# Patient Record
Sex: MALE | Race: WHITE | Employment: OTHER | RURAL
[De-identification: names, ages, dates, MRNs, and addresses within clinical notes are randomized per-mention and may not be internally consistent; named-entity substitution may affect disease eponyms.]

---

## 2021-03-26 ENCOUNTER — HOSPITAL ENCOUNTER (INPATIENT)
Age: 51
LOS: 10 days | Discharge: HOME | DRG: 885 | End: 2021-04-05
Attending: PSYCHIATRY & NEUROLOGY | Admitting: PSYCHIATRY & NEUROLOGY
Payer: COMMERCIAL

## 2021-03-26 PROBLEM — F31.9 BIPOLAR 1 DISORDER (HCC): Status: ACTIVE | Noted: 2021-03-26

## 2021-03-26 PROCEDURE — 74011250637 HC RX REV CODE- 250/637: Performed by: PSYCHIATRY & NEUROLOGY

## 2021-03-26 PROCEDURE — 65220000003 HC RM SEMIPRIVATE PSYCH

## 2021-03-26 PROCEDURE — 74011250636 HC RX REV CODE- 250/636: Performed by: PSYCHIATRY & NEUROLOGY

## 2021-03-26 RX ORDER — MAG HYDROX/ALUMINUM HYD/SIMETH 200-200-20
30 SUSPENSION, ORAL (FINAL DOSE FORM) ORAL
Status: DISCONTINUED | OUTPATIENT
Start: 2021-03-26 | End: 2021-04-05 | Stop reason: HOSPADM

## 2021-03-26 RX ORDER — ACETAMINOPHEN 325 MG/1
650 TABLET ORAL
Status: DISCONTINUED | OUTPATIENT
Start: 2021-03-26 | End: 2021-04-05 | Stop reason: HOSPADM

## 2021-03-26 RX ORDER — HALOPERIDOL 5 MG/ML
5 INJECTION INTRAMUSCULAR
Status: DISCONTINUED | OUTPATIENT
Start: 2021-03-26 | End: 2021-04-05 | Stop reason: HOSPADM

## 2021-03-26 RX ORDER — TRAZODONE HYDROCHLORIDE 50 MG/1
50 TABLET ORAL
Status: DISCONTINUED | OUTPATIENT
Start: 2021-03-26 | End: 2021-04-05 | Stop reason: HOSPADM

## 2021-03-26 RX ORDER — DIVALPROEX SODIUM 500 MG/1
1500 TABLET, EXTENDED RELEASE ORAL
Status: DISCONTINUED | OUTPATIENT
Start: 2021-03-26 | End: 2021-04-05 | Stop reason: HOSPADM

## 2021-03-26 RX ORDER — DIVALPROEX SODIUM 500 MG/1
1500 TABLET, EXTENDED RELEASE ORAL
COMMUNITY
End: 2021-04-05

## 2021-03-26 RX ORDER — IBUPROFEN 200 MG
1 TABLET ORAL
Status: DISCONTINUED | OUTPATIENT
Start: 2021-03-26 | End: 2021-04-05 | Stop reason: HOSPADM

## 2021-03-26 RX ORDER — IBUPROFEN 400 MG/1
400 TABLET ORAL
Status: DISCONTINUED | OUTPATIENT
Start: 2021-03-26 | End: 2021-04-05 | Stop reason: HOSPADM

## 2021-03-26 RX ORDER — QUETIAPINE FUMARATE 300 MG/1
300 TABLET, FILM COATED ORAL
COMMUNITY
End: 2021-04-05

## 2021-03-26 RX ORDER — ADHESIVE BANDAGE
30 BANDAGE TOPICAL DAILY PRN
Status: DISCONTINUED | OUTPATIENT
Start: 2021-03-26 | End: 2021-04-05 | Stop reason: HOSPADM

## 2021-03-26 RX ORDER — HYDROXYZINE PAMOATE 50 MG/1
50 CAPSULE ORAL
Status: DISCONTINUED | OUTPATIENT
Start: 2021-03-26 | End: 2021-04-05 | Stop reason: HOSPADM

## 2021-03-26 RX ADMIN — QUETIAPINE FUMARATE 300 MG: 200 TABLET ORAL at 21:17

## 2021-03-26 RX ADMIN — DIVALPROEX SODIUM 1500 MG: 500 TABLET, EXTENDED RELEASE ORAL at 21:17

## 2021-03-26 RX ADMIN — HALOPERIDOL LACTATE 5 MG: 5 INJECTION, SOLUTION INTRAMUSCULAR at 17:31

## 2021-03-26 NOTE — BH NOTES
Report received from Russ Hayes, RN at 1715. Admitted via  [facility] to unit [facility] pt, accompanied by security and  [place] police x 2..  Alert and oriented to person only. Affect bizarre; Mood elevated with delusions of grandeur and religiosity. \"Yelling \"I am God and I have been washed in the blood of Zeus. \" Tearful about \"sick mother in [place]. \" Pulling gown off. Had blood on hand from \"pulling hemorrhoid out as God wished. \"  Scant bright red blood noted in brief. Smoking and alcohol  danger situations, triggers, coping mechanisms, resources discussed and Nicotine patch offered. Refused. \"I am a  and I like all organic things. I own 3 houses. \"  Rambling. Does not hold a professional license in  .  Dr. Emily Miguel notified of arrival, along with RNS. Treatment will focus on jasvir for safety, medication and group activities to better manage anxiety/depression/mood lability/anger management. Placed on q 15 min safety checks. Ate all of meal while rambling nonsensical words. Showered after injection took effect and was calmer.     PRN Medication Documentation    Specific patient behavior that led to need for PRN medication:severe agitation, yelling, delusional, pulling clothes off  Staff interventions attempted prior to PRN being given:talk with pt, assess situation  PRN medication given: Haldol 5mg IM given at 94 [phone]  Patient response/effectiveness of PRN medication: calmer but still delusional by [id number]

## 2021-03-27 LAB
CHOLEST SERPL-MCNC: 112 MG/DL
EST. AVERAGE GLUCOSE BLD GHB EST-MCNC: 105 MG/DL
GLUCOSE P FAST SERPL-MCNC: 106 MG/DL (ref 65–100)
HBA1C MFR BLD: 5.3 % (ref 4–5.6)
HDLC SERPL-MCNC: 42 MG/DL
HDLC SERPL: 2.7 {RATIO} (ref 0–5)
LDLC SERPL CALC-MCNC: 55.8 MG/DL (ref 0–100)
LIPID PROFILE,FLP: NORMAL
TRIGL SERPL-MCNC: 71 MG/DL (ref ?–150)
TSH SERPL DL<=0.05 MIU/L-ACNC: 1.05 UIU/ML (ref 0.36–3.74)
VLDLC SERPL CALC-MCNC: 14.2 MG/DL

## 2021-03-27 PROCEDURE — 80061 LIPID PANEL: CPT

## 2021-03-27 PROCEDURE — 65220000003 HC RM SEMIPRIVATE PSYCH

## 2021-03-27 PROCEDURE — 84443 ASSAY THYROID STIM HORMONE: CPT

## 2021-03-27 PROCEDURE — 74011250637 HC RX REV CODE- 250/637: Performed by: PSYCHIATRY & NEUROLOGY

## 2021-03-27 PROCEDURE — 36415 COLL VENOUS BLD VENIPUNCTURE: CPT

## 2021-03-27 PROCEDURE — 83036 HEMOGLOBIN GLYCOSYLATED A1C: CPT

## 2021-03-27 PROCEDURE — 93005 ELECTROCARDIOGRAM TRACING: CPT

## 2021-03-27 PROCEDURE — 82947 ASSAY GLUCOSE BLOOD QUANT: CPT

## 2021-03-27 RX ORDER — HALOPERIDOL 5 MG/1
5 TABLET ORAL 2 TIMES DAILY
Status: DISCONTINUED | OUTPATIENT
Start: 2021-03-27 | End: 2021-03-28

## 2021-03-27 RX ORDER — HYDROCORTISONE 25 MG/G
CREAM TOPICAL
Status: DISCONTINUED | OUTPATIENT
Start: 2021-03-27 | End: 2021-04-05 | Stop reason: HOSPADM

## 2021-03-27 RX ADMIN — QUETIAPINE FUMARATE 300 MG: 200 TABLET ORAL at 21:21

## 2021-03-27 RX ADMIN — DIVALPROEX SODIUM 1500 MG: 500 TABLET, EXTENDED RELEASE ORAL at 21:21

## 2021-03-27 RX ADMIN — HYDROCORTISONE: 25 CREAM TOPICAL at 15:50

## 2021-03-27 RX ADMIN — HALOPERIDOL 5 MG: 5 TABLET ORAL at 09:39

## 2021-03-27 RX ADMIN — ACETAMINOPHEN 650 MG: 325 TABLET ORAL at 17:49

## 2021-03-27 NOTE — BH NOTES
Affect and mood labile. Attended and participated in group. Ate snacks. No interaction with peers. Intrusive with staff. + paranoid delusions. Delusions of grandeur. \" I am the Son of Jadiel 7. \" Believes that the staff is practicing [employer] and he stated, \"I don't feel safe here. \" Tearful. Denied SI/HI/AH. Endorsing VH. Seeing \"colors. \" Medication compliant. Co-operative. Pt rested quietly in bed with eyes closed for 8 hours. Respirations even and unlabored. Pt in no apparent distress.

## 2021-03-27 NOTE — H&P
History & Physical    Primary Care Provider: UNKNOWN  Source of Information: Patient/family     History of Presenting Illness:   Do Brown is a 48 y.o. male admitted to the behavioral health unit yesterday with acute psychosis. He had a very bizarre affect with delusions of grandeur and religiosity. On admission he had blood on his hand from \"pulling out a hemorrhoid as God wished. \"  He was noted to have scant blood on his underwear. Review of Systems:  A comprehensive review of systems was negative except for that written in the History of Present Illness. History reviewed. No pertinent past medical history. Past Surgical History:   Procedure Laterality Date    IR GASTRIC BAND ADJ W/ FLUORO         Prior to Admission medications    Medication Sig Start Date End Date Taking? Authorizing Provider   divalproex ER (Depakote ER) 500 mg ER tablet Take 1,500 mg by mouth nightly. Yes Provider, Historical   QUEtiapine (SEROqueL) 300 mg tablet Take 300 mg by mouth nightly. Yes Provider, Historical       No Known Allergies     Family History   Family history unknown: Yes        Social History     Socioeconomic History    Marital status: UNKNOWN     Spouse name: Not on file    Number of children: Not on file    Years of education: Not on file    Highest education level: Not on file   Tobacco Use    Smoking status: Former Smoker   Substance and Sexual Activity    Alcohol use: Not Currently   Other Topics Concern            CODE STATUS:  DNR    Full    Other      Objective:     Physical Exam:     Visit Vitals  /72 (BP 1 Location: Left upper arm, BP Patient Position: Sitting)   Pulse 97   Temp (!) 96.2 °F (35.7 °C)   Resp 19   Ht 5' 11\" (1.803 m)   Wt 80.7 kg (178 lb)   SpO2 98%   BMI 24.83 kg/m²           General:  Alert, cooperative, no distress, appears stated age. Head:  Normocephalic, without obvious abnormality, atraumatic. Eyes:  Conjunctivae/corneas clear. PERRL, EOMs intact. Nose: Nares normal. Septum midline. Mucosa normal. No drainage or sinus tenderness. Throat: Lips, mucosa, and tongue normal. Teeth and gums normal.   Neck: Supple, symmetrical, trachea midline, no adenopathy, thyroid: no enlargement/tenderness/nodules, no carotid bruit and no JVD. Back:   Symmetric, no curvature. ROM normal. No CVA tenderness. Lungs:   Clear to auscultation bilaterally. Chest wall:  No tenderness or deformity. Heart:  Regular rate and rhythm, S1, S2 normal, no murmur, click, rub or gallop. Abdomen:   Soft, non-tender. Bowel sounds normal. No masses,  No organomegaly. Extremities: Extremities normal, atraumatic, no cyanosis or edema. Pulses: 2+ and symmetric all extremities. Skin: Skin color, texture, turgor normal. No rashes or lesions   Neurologic: CNII-XII intact. No motor or sensory deficits.         24 Hour Results:    Recent Results (from the past 24 hour(s))   GLUCOSE, FASTING    Collection Time: 03/27/21  7:42 AM   Result Value Ref Range    Glucose 106 (H) 65 - 100 MG/DL   LIPID PANEL    Collection Time: 03/27/21  7:42 AM   Result Value Ref Range    LIPID PROFILE          Cholesterol, total 112 <200 MG/DL    Triglyceride 71 <150 MG/DL    HDL Cholesterol 42 MG/DL    LDL, calculated 55.8 0 - 100 MG/DL    VLDL, calculated 14.2 MG/DL    CHOL/HDL Ratio 2.7 0.0 - 5.0     TSH 3RD GENERATION    Collection Time: 03/27/21  7:42 AM   Result Value Ref Range    TSH 1.05 0.36 - 3.74 uIU/mL   HEMOGLOBIN A1C WITH EAG    Collection Time: 03/27/21  7:47 AM   Result Value Ref Range    Hemoglobin A1c 5.3 4.0 - 5.6 %    Est. average glucose 105 mg/dL         Imaging:   No orders to display           Assessment:   Acute psychosis    Bipolar disorder with acute luz    No active medical problems requiring intervention      Plan:   Patient medically stable      Home medications were reviewed    Signed By: Paola Mane MD     March 27, 2021

## 2021-03-27 NOTE — ROUTINE PROCESS
Shift change report given to DUSTY Edouard RN, re: SBAR, medications, and behavior. Susana Fall Risk Score - 2.

## 2021-03-27 NOTE — GROUP NOTE
[facility]  GROUP DOCUMENTATION INDIVIDUAL Group Therapy Note Date: 3/26/2021 Group Start Time: 2000 Group End Time: 2030 Group Topic: [email or web address] Liz Sierra [facility]  GROUP DOCUMENTATION GROUP Group Therapy Note Attendees: 3 Attendance: Attended Patient's Goal:  No goal set . Interventions/techniques: Follows Directions: Unable to follow directions Interactions: Unable to interact Mental Status: Preoccupied, Suspicious and Worried Behavior/appearance: Agitated Goals Achieved: Identified distorted thoughts/beliefs Additional Notes:  When patient was asked questions his answer would have nothing to do with the question . [facility]

## 2021-03-27 NOTE — BH NOTES
Alert and oriented x 2. Having delusions of grandeur - \"I am God\",  paranoia \"the water is not clean\" and \"I don't trust many meds\". New meds taken but reluctantly \"Would you give this to Jadiel 7? \" was asked when I was giving the med to him. Refused pm dose of Haldol stating \"I don't like the way it made me feel. \"  Admitted to stopping his meds a few weeks ago at home. Very restless. Has taken 3 showers this am and has changed clothes twice. Denies SI/HI/AVH/pain. Walks quickly. Drinking a lot of water. Cooperative with staff. Labile. Started crying loudly when his mother mentioned. Adhering to mask protocol when reminded. Did not want to talk about his incident of [place] prior to arriving in ER. EKG done.     PRN Medication Documentation    Specific patient behavior that led to need for PRN medication: hemorrhoid irritation  Staff interventions attempted prior to PRN being given: assess need  PRN medication given: Anusol HC cream pr at 1550  Patient response/effectiveness of PRN medication:\"it helped\"    PRN Medication Documentation    Specific patient behavior that led to need for PRN medication: eye strain due to wearing glasses  Staff interventions attempted prior to PRN being given:assess need  PRN medication given: Tylenol 650mg po given at [phone], rest eyes, dim lights  Patient response/effectiveness of PRN medication: 1/10 pain at 1830

## 2021-03-27 NOTE — GROUP NOTE
[facility] GROUP DOCUMENTATION INDIVIDUAL Group Therapy Note Date: 3/27/2021 Group Start Time: 1000 Group End Time: 1100 Group Topic: [employer] Mechelle Magallon 
 
[facility] GROUP DOCUMENTATION GROUP Group Therapy Note Attendees: 4 Attendance: Attended Patient's Goal:  Get off grid Interventions/techniques: Supported Follows Directions: Followed directions Interactions: Interacted appropriately Mental Status: Calm, Delusions, Happy and Suspicious Behavior/appearance: Cooperative Goals Achieved: Able to engage in interactions, Able to listen to others and Able to give feedback to another Additional Notes:  Home Reilly was out for the Group meeting today. He stated all is good (eats,sleeps). He says he is having no anxiety nor depression (but talking he will bust into tears). Bad things today is personal space can't stand people around him or listening to his conversations. But he says he is having a good time laughing and playing cards today with staff. He says he has no thoughts of thought anyone nor himself and is in no pain at this time. Bryon Garcia CNA

## 2021-03-27 NOTE — PROGRESS NOTES
Problem: Psychosis  Goal: *STG: Remains safe in hospital  Outcome: Progressing Towards Goal  Note: No harm has come to patient. Alert. Having delusions of grandeur - \"I am God\",  paranoia \"the water is not clean\" and \"I don't trust many meds\". New meds taken but reluctantly \"Would you give this to Jadiel 7? \" was asked when I was giving the med to him. Admitted to stopping his meds a few weeks ago at home. Very restless. Has taken 3 showers this am and has changed clothes twice. Denies SI/HI/AVH/pain. Walks quickly. Drinking a lot of water. Cooperative with staff. Labile. Started crying loudly when his mother mentioned.
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
## 2021-03-28 PROBLEM — F31.2 BIPOLAR AFFECTIVE DISORDER, MANIC, SEVERE, WITH PSYCHOTIC BEHAVIOR (HCC): Status: ACTIVE | Noted: 2021-03-26

## 2021-03-28 PROCEDURE — 74011250637 HC RX REV CODE- 250/637: Performed by: PSYCHIATRY & NEUROLOGY

## 2021-03-28 PROCEDURE — 65220000003 HC RM SEMIPRIVATE PSYCH

## 2021-03-28 RX ORDER — QUETIAPINE FUMARATE 200 MG/1
400 TABLET, FILM COATED ORAL
Status: DISCONTINUED | OUTPATIENT
Start: 2021-03-28 | End: 2021-03-29

## 2021-03-28 RX ADMIN — QUETIAPINE FUMARATE 400 MG: 200 TABLET ORAL at 21:21

## 2021-03-28 RX ADMIN — DIVALPROEX SODIUM 1500 MG: 500 TABLET, EXTENDED RELEASE ORAL at 21:20

## 2021-03-28 RX ADMIN — HYDROCORTISONE: 25 CREAM TOPICAL at 09:45

## 2021-03-28 NOTE — GROUP NOTE
[facility] GROUP DOCUMENTATION INDIVIDUAL Group Therapy Note Date: 3/28/2021 Group Start Time: 0038 Group End Time: 0765 Group Topic: [employer] 
 
[facility] GROUP DOCUMENTATION GROUP Group Therapy Note Attendees: 4 Attendance: Attended Patient's Goal:  Get to [place] Interventions/techniques: Supported Follows Directions: Followed directions Interactions: Interacted appropriately Mental Status: Delusions, Elevated, Flat, Happy, Preoccupied and Suspicious Behavior/appearance: Caretaking, Cooperative, Motivated, Poor eye contact and Withdrawn/quiet Goals Achieved: Able to engage in interactions, Able to listen to others and Able to give feedback to another Additional Notes:  Araceli Cutler was our for the Group Meeting today. He stated that he is having a good day, ate good, and slept good. He says he is having no anxiety nor depression at his time. He had a bad time trying to find glasses so he can see but having a good time going outside with staff and watching movies. He is having no thoughts of hurting himself nor anyone else and is having pain level 5 on his backside (hemorrhoids). Greg Zhu CNA

## 2021-03-28 NOTE — PROGRESS NOTES
SUBJECTIVE:  REMAINS VERY LABILE. STARTED CRYING DURING EVAL BECAUSE HE IS UPSET HE CANT GET READING GLASSES. HAS BEEN REFUSING HALDOL. \"IT MADE ME FEEL OUT OF IT FOR 5 HOURS. \"      Objective:   Vitals: stable and within normal limits  Labs: UDS NEGATIVE, BMP WNL, CBC WBC 14.1, TSH 1.05, HA1C 5.3, FLP WNL,         Mental Status Exam:  Appearance: CASUALLY DRESSED  Behavior: COOPERATIVE ,POOR EYE CONTACT, LOOKS AT FLOOR  Motor: no psychomotor agitation or retardation noted  Speech: MONOTONE  Mood: OK\"  Affect: LABILE, PSEUDOULBAR AFFECT, CRYING AT TIMES  Thought Content: NO SI/HI/AVH, RECENT DELUSIONS OF GRANDEUR  Thought Process: TANGENTIAL,   Orientation: A&O X 4  Insight/Judgment: POOR X 2     Assessment:  Axis I: BPAD I, MANIC WITH PSYCHOTIC FEATURES          Plan:  DC HALDOL. WOULD PREFER MEDICINE WHERE HE COULD HAVE LONG ACTING INJECTABLE BUT HE REFUSES TO TAKE ANYTHING OTHER THAN DEPAKOTE AND SEROQUEL  INCREASE SEROQUEL TO 400MG FOR THOUGHT DISORDER     REASON FOR CONTINUED STAY: STILL LABILE, MED ADJUSTMENTS        1. I certify that the patient needs 24 hours of medical supervision to improve the above conditions. 2. The current mental illness is classified as severe. 3. Outpatient services only are insufficient currently to treat this patient's current psychiatric condition. 4. There is continued need for psychiatric inpatient treatment to address the above condition. 5. I certify that current psychiatric treatment could be reasonably expected to improve the patient's condition. 6. I certify that the patient should expect to make improvements as a result of inpatient psychiatric services  7. The risks and benefits of treatment were discussed with the patient.

## 2021-03-28 NOTE — BH NOTES
Affect tearful/labile. Mood anxious/depressed. Alert and oriented x 2. Having delusions of grandeur but at a lesser degree. Suspicious of new treatments and  contaminated water. Drinks bottled water. Ate all of meals. Unable to focus on any task at hand for over a few minutes. Using hemorrhoid cream for external hemorrhoid 1x1 cm. Small amt bright  red blood on brief. Small amt clear discharge noted in brief. Med compliant. Refused Haldol this am.  Dr. Mitra Spain noted. Attended groups as active participant but restless.

## 2021-03-28 NOTE — PROGRESS NOTES
Problem: Psychosis    Goal: *STG: Decreased delusional thinking    Affect and mood labile. Denied AVH. +paranoid delusions. Grandiose. Believes that both the TV and the computers, are sending signals to him. Assessed pt for signs/symptoms of thought disorder. Encouraged sharing of feelings. Monitored medication compliance and effectiveness. Redirected as needed. Encouraged focus on reality. Offered encouragement, reassurance, and support.     Outcome: Progressing Towards Goal

## 2021-03-28 NOTE — BH NOTES
Affect constricted, mood depressed/anxious. Refused group. Ate snacks. No interaction with peers. Interacting with staff. Denied SI/HI/AVH/pain. + paranoid delusions. Religiously preoccupied. Continues to refer to himself as \"the son of Gabino Gaona.\" Medication compliant with encouragement. \"I hope that I am getting the medications that I get at the [place]. \" Co-operative. Pt rested quietly in bed with eyes closed for 7.5 hours. Respirations even and unlabored. Pt in no apparent distress.

## 2021-03-28 NOTE — BH NOTES
Lupe Nunes was out for the [employer] Group but got his snacks and left before I could asked him questions.     Marylou Mcguire CNA

## 2021-03-28 NOTE — PROGRESS NOTES
Problem: Psychosis  Goal: *STG/LTG: Demonstrates improved thought patterns as evidenced by logical and coherent speech  Outcome: Progressing Towards Goal  Note: Affect tearful/labile. Mood anxious/depressed. Alert and oriented x 2. Having delusions of grandeur but at a lesser degree. Suspicious of new treatments and  contaminated water. Drinks bottled water. Ate all of meals. Unable to focus on any task at hand for over a few minutes. Using hemorrhoid cream for external hemorrhoid 1x1 cm. Small amt bright  red blood on brief. Small amt clear discharge noted in brief. Med compliant. Refused Haldol this am.  Dr. Nico Khan noted. Attended groups as active participant but restless.

## 2021-03-28 NOTE — GROUP NOTE
[facility]  GROUP DOCUMENTATION INDIVIDUAL Group Therapy Note Date: 3/28/2021 Group Start Time: 1100 Group End Time: 1200 Group Topic: Nursing 1 Shavonne Gillian Finn RN 
 
[facility] GROUP DOCUMENTATION GROUP Group Therapy Note Attendees: 1/1 Attendance: Attended Patient's Goal: to be able to read my Bible Interventions/techniques: Supported Follows Directions: Followed directions Interactions: Disorganized interaction Mental Status: Labile Behavior/appearance: Cooperative Goals Achieved: Able to listen to others Additional Notes:  Delusional, grandeur Le Muller RN

## 2021-03-29 PROCEDURE — 74011250637 HC RX REV CODE- 250/637: Performed by: PSYCHIATRY & NEUROLOGY

## 2021-03-29 PROCEDURE — 65220000003 HC RM SEMIPRIVATE PSYCH

## 2021-03-29 RX ORDER — QUETIAPINE FUMARATE 200 MG/1
600 TABLET, FILM COATED ORAL
Status: DISCONTINUED | OUTPATIENT
Start: 2021-03-29 | End: 2021-03-31

## 2021-03-29 RX ADMIN — QUETIAPINE FUMARATE 600 MG: 200 TABLET ORAL at 21:16

## 2021-03-29 RX ADMIN — DIVALPROEX SODIUM 1500 MG: 500 TABLET, EXTENDED RELEASE ORAL at 21:16

## 2021-03-29 RX ADMIN — HYDROCORTISONE: 25 CREAM TOPICAL at 08:51

## 2021-03-29 NOTE — BH NOTES
Affect blunted/restricted, mood depressed/anxious. Patient is alert and oriented x 4 with disorganized thinking. Patient cooperative and pleasant. No agitation or aggression noted. Patient denies SI/HI/AVH/pain. Patient continues to have some grandiose delusional.  Patient is compliant with medications and PRN given. Patient appetite good eats 100% of all meals plus snacks. Patient attended and engaged in groups with prompting. Patient went outside with staff and peers for fresh-air break/recreation. Patient compliant with wearing his face mask and social distancing protocol. Patient in no apparent distress all vital signs within normal limits. Patient TDO hearing tomorrow. PRN Medication Documentation    Specific patient behavior that led to need for PRN medication: Patient c/o having hemorrhoids. Staff interventions attempted prior to PRN being given: Assessment done and offered PRN hydrocortisone rectal cream.  PRN medication given: Hydrocortisone rectal cream at 0851 for hemorrhoids. Patient response/effectiveness of PRN medication: Positive results.

## 2021-03-29 NOTE — PROGRESS NOTES
Problem: Psychosis  Goal: *STG: Remains safe in hospital  Outcome: Progressing Towards Goal  Goal: *STG: Seeks staff when feelings of self harm or harm towards others arise  Outcome: Progressing Towards Goal  Goal: *STG: Participates in individual and group therapy  Outcome: Progressing Towards Goal  Goal: *STG/LTG: Complies with medication therapy  Outcome: Progressing Towards Goal     Problem: Patient Education: Go to Patient Education Activity  Goal: Patient/Family Education  Outcome: Progressing Towards Goal

## 2021-03-29 NOTE — ROUTINE PROCESS
Shift change report given to Kobi Melvin RN, re: SBAR, medications, and behavior. Susana Fall Risk Score - 2.

## 2021-03-29 NOTE — BH NOTES
Affect constricted, mood depressed/anxious with improvement. Attended and participated in group. Ate snacks. Interacting appropriately with staff and peers. Denied SI/HI/AVH/pain. +paranoid delusions. but with less intensity. Medication compliant without incident. Pleasant and co-operative. Polite. Pt rested quietly in bed with eyes closed for 5 hours. Respirations even and unlabored. Pt in no apparent distress.

## 2021-03-29 NOTE — BH NOTES
Shift change report [facility] [facility] re: SBAR, medications, and behavior.   Susana fall risk score: (2)

## 2021-03-29 NOTE — BH NOTES
SOCIAL WORK PROGRESS NOTE    NAME:Yang Shetty  : 1970  MRN: [id number]      Patient presentation including presence/absence SI/HI, psychosis, ability to perform ADLs: patient denies SI/HI he is less restless, less labile.  His thoughts are still tangential and disorganized, he does have some Anglican and grandiose delusions, he is able to do ADL care    Concerns expressed by patient: no concerns at this time    Family involvement: none at this time    Resource needs: receives services at the [place]    Strengths: educated able to care for self    Limitations: poor insight and judgment, non compliance    Other: he has restarted medications, agreeable to increasing certain meds, he is less psychotic and delusional and not quite as labile, he will have TDO hearing tomorrow

## 2021-03-29 NOTE — GROUP NOTE
[facility]  GROUP DOCUMENTATION INDIVIDUAL Group Therapy Note Date: 3/29/2021 Group Start Time: 5438 Group End Time: 1100 Group Topic: [employer] Joseline Mary 
 
[facility] GROUP DOCUMENTATION GROUP Group Therapy Note Attendees: 5 Attendance: Attended Patient's Goal:  To prepare for discharge Interventions/techniques: Promoted peer support Follows Directions: Followed directions Interactions: Interacted appropriately Mental Status: Calm Behavior/appearance: Attentive and Cooperative Goals Achieved: Able to engage in interactions, Able to listen to others, Able to give feedback to another and Able to reflect/comment on own behavior Additional Notes:  No complaints of pain, anxiety or depression, denies suicidal ideations Lisa Antonio

## 2021-03-29 NOTE — PROGRESS NOTES
INTERVAL Hx:  Records and clinical information were reviewed. States he's feeling \"good\" now and objectively he is slightly less psychotic/delusional compared to PTA. However, he's still delusional and has impulsive thinking with very poor judgment due a very limited insight. However, he was agreeable to increasing the Seroquel once we completed the interview. Also appeared to be less labile today--not dramatically tearful like the past few days. Thoughts are still accelerated and tangential. No SE's with the meds so far--no sedation, EPSE's, or TD sx's. Slept 5 hours last night. MEDS:  Current Facility-Administered Medications   Medication Dose Route Frequency    QUEtiapine (SEROquel) tablet 600 mg  600 mg Oral QHS    hydrocortisone (ANUSOL-HC) 2.5 % rectal cream   PeriANAL BID PRN    magnesium hydroxide (MILK OF MAGNESIA) 400 mg/5 mL oral suspension 30 mL  30 mL Oral DAILY PRN    haloperidol lactate (HALDOL) injection 5 mg  5 mg IntraMUSCular Q6H PRN    hydrOXYzine pamoate (VISTARIL) capsule 50 mg  50 mg Oral Q6H PRN    traZODone (DESYREL) tablet 50 mg  50 mg Oral QHS PRN    alum-mag hydroxide-simeth (MYLANTA) oral suspension 30 mL  30 mL Oral Q4H PRN    nicotine (NICODERM CQ) 21 mg/24 hr patch 1 Patch  1 Patch TransDERmal DAILY PRN    acetaminophen (TYLENOL) tablet 650 mg  650 mg Oral Q4H PRN    ibuprofen (MOTRIN) tablet 400 mg  400 mg Oral Q6H PRN    divalproex ER (DEPAKOTE ER) 24 hour tablet 1,500 mg  1,500 mg Oral QHS       VITALS:   Patient Vitals for the past 12 hrs:   Temp Pulse Resp BP SpO2   03/29/21 0756 (!) 96.3 °F (35.7 °C) 77 16 125/72 100 %       LABS: .No results found for this or any previous visit (from the past 24 hour(s)).     MSE:   Appearance: casually dressed; adequately groomed  Behavior: less restless; no agitation  Motor: mostly normal  Mood/Affect: less labile--still elevated  Thought Process: tangential; occ disorganized  Thought Content: still some Mandaen and grandiose delusions; no SI/HI  Perceptions: no apparent hallucinations  Insight/Judgment: very limited    ASSESSMENT:   1. Bipolar Disorder, manic, with psychosis (F31.2)    PLAN:  1. Increase the Seroquel to 600 mg QHS until more stable. 2. Continue the VPA ER at 1500 mg QHS and check a level, CBC, and LFT's tomorrow. 3. Hearing tomorrow. 4. ELOS: 5-10 days.

## 2021-03-29 NOTE — ROUTINE PROCESS
Shift change report given to Demarco Harris Rn, re: SBAR. Medications, and behavior. Susana Fall Risk Score (2)

## 2021-03-29 NOTE — GROUP NOTE
[facility] GROUP DOCUMENTATION INDIVIDUAL Group Therapy Note Date: 3/28/2021 Group Start Time: 2000 Group End Time: 2040 Group Topic: [employer] [address] Jillian Kraft 
 
[facility]  GROUP DOCUMENTATION GROUP Group Therapy Note Attendees: 4/6 Attendance: Attended Patient's Goal:  To talk to SW about discharge plans Interventions/techniques: Promoted peer support Follows Directions: Followed directions Interactions: Interacted appropriately Mental Status: Calm Behavior/appearance: Attentive and Cooperative Goals Achieved: Able to engage in interactions, Able to listen to others, Able to give feedback to another, Able to reflect/comment on own behavior, Able to receive feedback and Able to self-disclose Additional Notes:  PT did not report any pain, anxiety, depression, or SI at the moment. Dino Ruelas

## 2021-03-29 NOTE — GROUP NOTE
[facility]  GROUP DOCUMENTATION INDIVIDUAL Group Therapy Note Date: 3/29/2021 Group Start Time: 0900 Group End Time: 2517 Group Topic: Process Group - Inpatient [address] OP Bertha Party [facility] GROUP DOCUMENTATION GROUP Group Therapy Note Focus of session was on positive self talk when feeling anger. We spoke about how we can develop the skill of helping ourselves out of moments of anger that have a tendency to then impact your day and your functioning and well being. We spoke about the usual self talk that goes with anger and how it can make things worse and asked group members to share their anger thoughts that are typical for them. I shared some ideas for self talk of managing anger such as I don't need to prove myself in this situation or as long as I keep my cool, I am in control or people are going to act the way they want to, not the way I want them to.   We spoke about how these ideas can better sooth our anger and help us refocus on healthy self care. Attendance: Attended Patient's Goal:   
  
Patient will verbalize areas in need of boundary recognition and limit setting Interventions/techniques: Informed, Validated, Provide feedback, Reinforced and Supported Follows Directions: Followed directions Interactions: Interacted appropriately Mental Status: Calm and Congruent Behavior/appearance: Attentive and Cooperative Goals Achieved: Able to engage in interactions, Able to listen to others, Able to give feedback to another, Able to reflect/comment on own behavior, Able to self-disclose, Discussed coping, Discussed safety plan, Identified triggers and Identified relapse prevention strategies Additional Notes:  Pt was attentive and cooperative. He was new to this  group session and he engaged with the other group members and the activity. Pt stated that he wanted to get his medications worked out and had a few things he wanted to do for himself. He stated this is not his first inpatient placement. He has recently moved and now wants to help take care of his mother. Lisa Pierce

## 2021-03-29 NOTE — PROGRESS NOTES
Problem: Risk of Harm to Self or Others    Goal: *LTG: Denial of suicidal ideation or intent for at least 2 days    Affect blunted, mood depressed/anxious. Denied SI or intent to harm self. Denied AVH. +paranoid delusions with less intensity. Assessed/monitored potential harm to self. Encouraged sharing of feelings. Monitored medication compliance and effectiveness. Monitored pt's daily level of functioning. Offered encouragement, reassurance, and support.     Outcome: Progressing Towards Goal

## 2021-03-30 LAB
ALBUMIN SERPL-MCNC: 3.2 G/DL (ref 3.5–5)
ALBUMIN/GLOB SERPL: 1 {RATIO} (ref 1.1–2.2)
ALP SERPL-CCNC: 57 U/L (ref 45–117)
ALT SERPL-CCNC: 33 U/L (ref 12–78)
AST SERPL-CCNC: 18 U/L (ref 15–37)
ATRIAL RATE: 68 BPM
BASOPHILS # BLD: 0.1 K/UL (ref 0–0.1)
BASOPHILS NFR BLD: 1 % (ref 0–1)
BILIRUB DIRECT SERPL-MCNC: 0.1 MG/DL (ref 0–0.2)
BILIRUB SERPL-MCNC: 0.3 MG/DL (ref 0.2–1)
CALCULATED P AXIS, ECG09: 62 DEGREES
CALCULATED R AXIS, ECG10: 78 DEGREES
CALCULATED T AXIS, ECG11: 60 DEGREES
COMMENT, HOLDF: NORMAL
DIAGNOSIS, 93000: NORMAL
DIFFERENTIAL METHOD BLD: ABNORMAL
EOSINOPHIL # BLD: 0.5 K/UL (ref 0–0.4)
EOSINOPHIL NFR BLD: 5 % (ref 0–7)
ERYTHROCYTE [DISTWIDTH] IN BLOOD BY AUTOMATED COUNT: 13.2 % (ref 11.5–14.5)
GLOBULIN SER CALC-MCNC: 3.3 G/DL (ref 2–4)
HCT VFR BLD AUTO: 44.4 % (ref 36.6–50.3)
HGB BLD-MCNC: 15 G/DL (ref 12.1–17)
IMM GRANULOCYTES # BLD AUTO: 0 K/UL (ref 0–0.04)
IMM GRANULOCYTES NFR BLD AUTO: 0 % (ref 0–0.5)
LYMPHOCYTES # BLD: 2.6 K/UL (ref 0.8–3.5)
LYMPHOCYTES NFR BLD: 30 % (ref 12–49)
MCH RBC QN AUTO: 30.9 PG (ref 26–34)
MCHC RBC AUTO-ENTMCNC: 33.8 G/DL (ref 30–36.5)
MCV RBC AUTO: 91.5 FL (ref 80–99)
MONOCYTES # BLD: 0.8 K/UL (ref 0–1)
MONOCYTES NFR BLD: 9 % (ref 5–13)
NEUTS SEG # BLD: 4.6 K/UL (ref 1.8–8)
NEUTS SEG NFR BLD: 55 % (ref 32–75)
NRBC # BLD: 0 K/UL (ref 0–0.01)
NRBC BLD-RTO: 0 PER 100 WBC
P-R INTERVAL, ECG05: 130 MS
PLATELET # BLD AUTO: 213 K/UL (ref 150–400)
PMV BLD AUTO: 10.4 FL (ref 8.9–12.9)
PROT SERPL-MCNC: 6.5 G/DL (ref 6.4–8.2)
Q-T INTERVAL, ECG07: 398 MS
QRS DURATION, ECG06: 98 MS
QTC CALCULATION (BEZET), ECG08: 423 MS
RBC # BLD AUTO: 4.85 M/UL (ref 4.1–5.7)
SAMPLES BEING HELD,HOLD: NORMAL
VALPROATE SERPL-MCNC: 76 UG/ML (ref 50–100)
VENTRICULAR RATE, ECG03: 68 BPM
WBC # BLD AUTO: 8.6 K/UL (ref 4.1–11.1)

## 2021-03-30 PROCEDURE — 85025 COMPLETE CBC W/AUTO DIFF WBC: CPT

## 2021-03-30 PROCEDURE — 36415 COLL VENOUS BLD VENIPUNCTURE: CPT

## 2021-03-30 PROCEDURE — 80076 HEPATIC FUNCTION PANEL: CPT

## 2021-03-30 PROCEDURE — 80164 ASSAY DIPROPYLACETIC ACD TOT: CPT

## 2021-03-30 PROCEDURE — 74011250637 HC RX REV CODE- 250/637: Performed by: PSYCHIATRY & NEUROLOGY

## 2021-03-30 PROCEDURE — 65220000003 HC RM SEMIPRIVATE PSYCH

## 2021-03-30 RX ADMIN — DIVALPROEX SODIUM 1500 MG: 500 TABLET, EXTENDED RELEASE ORAL at 21:24

## 2021-03-30 RX ADMIN — HYDROCORTISONE: 25 CREAM TOPICAL at 12:43

## 2021-03-30 RX ADMIN — QUETIAPINE FUMARATE 600 MG: 200 TABLET ORAL at 21:24

## 2021-03-30 NOTE — GROUP NOTE
[facility] GROUP DOCUMENTATION INDIVIDUAL Group Therapy Note Date: 3/30/2021 Group Start Time: 0900 Group End Time: 1801 Group Topic: Process Group - Inpatient [address] OP Yani Meek 
 
[facility] GROUP DOCUMENTATION GROUP Group Therapy Note Attendees: Focus of session was based on an article found on the website [email or web address] about the 7 things that you can control in life.   I shared with group those 7 things which include your breath, your self talk, your gratitude, your body language, your mental and physical fitness, your diet, and your sleep. We spoke about how lack of focus on these areas affects us and what we can do to improve control over these 7 aspects. Attendance: Attended Patient's Goal:   
  
Patient will develop strategies to regulate emotions and corresponding behavior Interventions/techniques: Informed, Validated, Provide feedback and Supported Follows Directions: Did not follow directions Interactions: Disorganized interaction Mental Status: Anxious, Congruent, Preoccupied, Suspicious and Worried Behavior/appearance: Attentive, Needed prompting, Negative, Poor eye contact and Resistive/oppositional 
 
Goals Achieved: Able to listen to others, Able to give feedback to another, Able to reflect/comment on own behavior, Able to receive feedback, Able to self-disclose, Discussed coping, Identified feelings and Identified triggers Additional Notes:  Carolyne Ross participated in group when he was finished with his commitment hearing. He was upset and agitated that he was committed and he was refusing to stay in group and upset that he was not getting services at the [place]. He spoke about how he is not able to calm down and accept this news.   I encouraged him to focus on what he can control and he was willing to listen, but he did not think he could do anything positive to accept this news. Karli Fierro

## 2021-03-30 NOTE — BH NOTES
Shift change report given to [facility] [place] re: SBAR, medications, and behavior.   Susana fall risk score: (2)

## 2021-03-30 NOTE — BH NOTES
Master Treatment Plan Review for Do Brown    Date of Admission Date Treatment Plan Reviewed Anticipated Date of Discharge Legal Status    03/26/2021 03/30/2021  TDO     Treatment & Discharge Planning Changes    Modality or Intervention Changes 03/27/2021- TSH, Lipid Panel     Psychotropic Medication Changes 03/29/2021-Seroquel 600 mg PO QHS  03/28/2021- D/C Haldol, Seroquel increased to 400 mg PO QHS  03/27/2021- Haldol 5 mg PO BID   Discharge Planning or Target Date Changes ELOS: 5-10 days   New Issues N/A       Treatment Team Signatures    I have participated in the development of this plan of treatment and agree to its implementation.     Patient Signature       Patient Printed Name Date/Time   /Therapist Signature       /Therapist Printed Name Date/Time   RN Signature       RN Printed Name  Gabriele Judge [place] Date/Time  03/29/2021  @6503   Unit  Signature       Unit  Printed Name Date/Time   MD Signature       MD Printed Name Date/Time

## 2021-03-30 NOTE — PROGRESS NOTES
Problem: Psychosis  Goal: *STG: Decreased hallucinations  Outcome: Progressing Towards Goal  Goal: *STG: Decreased delusional thinking  Outcome: Progressing Towards Goal  Goal: *STG: Remains safe in hospital  Outcome: Progressing Towards Goal  Goal: *STG: Seeks staff when feelings of self harm or harm towards others arise  Outcome: Progressing Towards Goal  Goal: *STG: Participates in individual and group therapy  Outcome: Progressing Towards Goal  Goal: *STG/LTG: Complies with medication therapy  Outcome: Progressing Towards Goal     Problem: Patient Education: Go to Patient Education Activity  Goal: Patient/Family Education  Outcome: Progressing Towards Goal

## 2021-03-30 NOTE — GROUP NOTE
[facility]  GROUP DOCUMENTATION INDIVIDUAL Group Therapy Note Date: 3/30/2021 Group Start Time: 4537 Group End Time: 1100 Group Topic: [employer] Dawna Mullins [facility] GROUP DOCUMENTATION GROUP Group Therapy Note Attendees: 5 Attendance: {[facility] GROUP DOC ATTENDANCE:79241} Patient's Goal:  *** Interventions/techniques: {[facility] GROUP DOC INTERVENTIONS/TECHNIQUES:44804} Follows Directions: {[facility] GROUP DOC FOLLOWS DIRECTION:28780} Interactions: {[facility] GROUP DOC INTERACTIONS:73875} Mental Status: {[facility] GROUP [facility] MENTAL VWJTTF:50386} Behavior/appearance: {[facility] GROUP DOC BEHAVIOR/APPEARANCE:74511} Goals Achieved: {[facility] GROUP DOC GOALS ACHIEVED:11050} Additional Notes:  *** Tammy Starkey

## 2021-03-30 NOTE — ROUTINE PROCESS
Shift change report given to Ramiro Castro Rn, re: SBAR. Medications, and behavior. Susana Fall Risk Score (2)

## 2021-03-30 NOTE — PROGRESS NOTES
INTERVAL Hx:  Records and clinical information were reviewed. Again reports that he's feeling \"better\", but he's still hypomanic and therefore impulsive, slightly grandiose, and exhibiting very poor judgment. He's definitely not as overtly psychotic as he had been, but his reality testing is very impaired. His thoughts are also still very tangential and often irrelevant to the topic/issues. He's also been less labile the past couple of days, but his emotions are still somewhat labile. No SE's with the meds so far including the increased dose of Seroquel--no sedation, EPSE's, or TD sx's. Slept 7.25 hours last night. Was committed at the hearing today. CBC and LFT's WNL.     MEDS:  Current Facility-Administered Medications   Medication Dose Route Frequency    QUEtiapine (SEROquel) tablet 600 mg  600 mg Oral QHS    hydrocortisone (ANUSOL-HC) 2.5 % rectal cream   PeriANAL BID PRN    magnesium hydroxide (MILK OF MAGNESIA) 400 mg/5 mL oral suspension 30 mL  30 mL Oral DAILY PRN    haloperidol lactate (HALDOL) injection 5 mg  5 mg IntraMUSCular Q6H PRN    hydrOXYzine pamoate (VISTARIL) capsule 50 mg  50 mg Oral Q6H PRN    traZODone (DESYREL) tablet 50 mg  50 mg Oral QHS PRN    alum-mag hydroxide-simeth (MYLANTA) oral suspension 30 mL  30 mL Oral Q4H PRN    nicotine (NICODERM CQ) 21 mg/24 hr patch 1 Patch  1 Patch TransDERmal DAILY PRN    acetaminophen (TYLENOL) tablet 650 mg  650 mg Oral Q4H PRN    ibuprofen (MOTRIN) tablet 400 mg  400 mg Oral Q6H PRN    divalproex ER (DEPAKOTE ER) 24 hour tablet 1,500 mg  1,500 mg Oral QHS       VITALS:   Patient Vitals for the past 12 hrs:   Temp Pulse Resp BP SpO2   03/30/21 0728 (!) 96.1 °F (35.6 °C) 81 18 108/66 98 %       LABS: .  Recent Results (from the past 24 hour(s))   HEPATIC FUNCTION PANEL    Collection Time: 03/30/21  6:03 AM   Result Value Ref Range    Protein, total 6.5 6.4 - 8.2 g/dL    Albumin 3.2 (L) 3.5 - 5.0 g/dL    Globulin 3.3 2.0 - 4.0 g/dL    A-G Ratio 1.0 (L) 1.1 - 2.2      Bilirubin, total 0.3 0.2 - 1.0 MG/DL    Bilirubin, direct 0.1 0.0 - 0.2 MG/DL    Alk. phosphatase 57 45 - 117 U/L    AST (SGOT) 18 15 - 37 U/L    ALT (SGPT) 33 12 - 78 U/L   CBC WITH AUTOMATED DIFF    Collection Time: 03/30/21  6:03 AM   Result Value Ref Range    WBC 8.6 4.1 - 11.1 K/uL    RBC 4.85 4.10 - 5.70 M/uL    HGB 15.0 12.1 - 17.0 g/dL    HCT 44.4 36.6 - 50.3 %    MCV 91.5 80.0 - 99.0 FL    MCH 30.9 26.0 - 34.0 PG    MCHC 33.8 30.0 - 36.5 g/dL    RDW 13.2 11.5 - 14.5 %    PLATELET 235 410 - 694 K/uL    MPV 10.4 8.9 - 12.9 FL    NRBC 0.0 0  WBC    ABSOLUTE NRBC 0.00 0.00 - 0.01 K/uL    NEUTROPHILS 55 32 - 75 %    LYMPHOCYTES 30 12 - 49 %    MONOCYTES 9 5 - 13 %    EOSINOPHILS 5 0 - 7 %    BASOPHILS 1 0 - 1 %    IMMATURE GRANULOCYTES 0 0.0 - 0.5 %    ABS. NEUTROPHILS 4.6 1.8 - 8.0 K/UL    ABS. LYMPHOCYTES 2.6 0.8 - 3.5 K/UL    ABS. MONOCYTES 0.8 0.0 - 1.0 K/UL    ABS. EOSINOPHILS 0.5 (H) 0.0 - 0.4 K/UL    ABS. BASOPHILS 0.1 0.0 - 0.1 K/UL    ABS. IMM. GRANS. 0.0 0.00 - 0.04 K/UL    DF AUTOMATED     SAMPLES BEING HELD    Collection Time: 03/30/21  6:03 AM   Result Value Ref Range    SAMPLES BEING HELD 1SST     COMMENT        Add-on orders for these samples will be processed based on acceptable specimen integrity and analyte stability, which may vary by analyte. MSE:   Appearance: casually dressed; adequately groomed  Behavior: less restless; no agitation  Motor: mostly normal  Mood/Affect: less labile--still elevated  Thought Process: tangential; often disorganized  Thought Content: still some Congregation and grandiose delusions; no SI/HI  Perceptions: no apparent hallucinations  Insight/Judgment: very limited    ASSESSMENT:   1. Bipolar Disorder, manic, with psychosis (F31.2)    PLAN:  1. Continue the Seroquel at 600 mg QHS until more stable. 2. Continue the VPA ER at 1500 mg QHS--level is pending from this AM.  3. ELOS: 6-10 days.

## 2021-03-30 NOTE — GROUP NOTE
[facility]  GROUP DOCUMENTATION INDIVIDUAL Group Therapy Note Date: 3/29/2021 Group Start Time: 2000 Group End Time: 2100 Group Topic: [employer] Jillian Conte  GROUP DOCUMENTATION GROUP Group Therapy Note Attendees: 5/5 Attendance: Attended Patient's Goal:  To work on getting transferred to another facility Interventions/techniques: Promoted peer support and Provide feedback Follows Directions: Followed directions Interactions: Interacted appropriately Mental Status: Calm Behavior/appearance: Attentive and Cooperative Goals Achieved: Able to engage in interactions, Able to listen to others, Able to give feedback to another, Able to reflect/comment on own behavior, Able to manage/cope with feelings, Able to receive feedback and Able to self-disclose Additional Notes:  PT reported no pain, anxiety, depression, or SI.  
 
Megan Lebron

## 2021-03-30 NOTE — PROGRESS NOTES
Problem: Falls - Risk of  Goal: *Absence of Falls  Description: Document Candi Spare Fall Risk and appropriate interventions in the flowsheet.   Outcome: Progressing Towards Goal  Note: Fall Risk Interventions:       Mentation Interventions: Door open when patient unattended    Medication Interventions: Teach patient to arise slowly                   Problem: Psychosis  Goal: *STG: Decreased hallucinations  Outcome: Progressing Towards Goal  Goal: *STG: Decreased delusional thinking  Outcome: Progressing Towards Goal

## 2021-03-30 NOTE — GROUP NOTE
[facility] GROUP DOCUMENTATION INDIVIDUAL Group Therapy Note Date: 3/30/2021 Group Start Time: 2142 Group End Time: 5542 Group Topic: Nursing Nelly Hutchins Dusty Cancino RN 
 
[facility] GROUP DOCUMENTATION GROUP Group Therapy Note Attendees: 4 Attendance: Attended Patient's Goal:  Patient will be compliant with medication regimen daily. Interventions/techniques: Provide feedback Follows Directions: Followed directions Interactions: Interacted appropriately Mental Status: Blunted and Elevated Behavior/appearance: Cooperative and Pressured speech Goals Achieved: Able to engage in interactions, Able to listen to others and Able to give feedback to another Additional Notes: Patient attended and engaged in today's Nursing Medication Education Group with prompting. Patient had to redirected constantly and remind to stay subject focused. Patient provided handouts.  
 
Ellie Judge RN

## 2021-03-30 NOTE — BH NOTES
Patient had TDO hearing this date, he was involuntarily committed. He did participate in his hearing. He will be enrolled into the reinvestment project for funding.

## 2021-03-30 NOTE — BH NOTES
Affect blunted, mood labile. Patient was involuntarily committed at his court hearing today. Patient is alert and oriented x 4. Patient has grandiose delusions and tangential.  Patient denies SI/HI/AVH/pain. Patient is compliant with medications and PRN's given. Patient appetite good eats  100% of all meals plus snacks. Patient attended and engaged in groups with prompting. Patient wearing his face mask and adhering to social distancing protocol. Patient in no apparent distress all vital signs within normal limits. PRN Medication Documentation    Specific patient behavior that led to need for PRN medication: Patient requested a Nicotine patch to help decrease smoking cravings. Staff interventions attempted prior to PRN being given: Assessment done. PRN medication given: Nicotine patch 21 mg applied topically at 1018 to help decrease smoking cravings. Patient response/effectiveness of PRN medication: Positive effects. PRN Medication Documentation    Specific patient behavior that led to need for PRN medication: Patient requested PRN Anusol-HC rectal cream for hemorrhoids. Staff interventions attempted prior to PRN being given: Assessment done. PRN medication given: Anusol-HC rectal cream applied to help with protruding hemorrhoids. Patient response/effectiveness of PRN medication: Positive effects. Staff will continue to monitor.
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
## 2021-03-30 NOTE — SUICIDE SAFETY PLAN
SAFETY PLAN    A suicide Safety Plan is a document that supports someone when they are having thoughts of suicide. Warning Signs that indicate a suicidal crisis may be developing: What (situations, thoughts, feelings, body sensations, behaviors, etc.) do you experience that lets you know you are beginning to think about suicide? 1. none  2.   3. Internal Coping Strategies:  What things can I do (relaxation techniques, hobbies, physical activities, etc.) to take my mind off my problems without contacting another person? 1. running  2. gardening  3. [address] working    [employer] and social settings that provide distraction: Who can I call or where can I go to distract me? 1. Name: [facility]  Phone: [phone]  2. Name: Aneglica Guardado  Phone: [phone]   3. Place:            4. Place:     People whom I can ask for help: Who can I call when I need help - for example, friends, family, clergy, someone else? 1. Name: [facility]                 Phone: [phone]  2. Name:   Phone:   3. Name:   Phone:     Professionals or [address] [place] [address] Blvd I can contact during a crisis: Who can I call for help - for example, my doctor, my psychiatrist, my psychologist, a mental health provider, a suicide hotline? 1. Clinician Name: [place] Phone: [phone]      Clinician Pager or Emergency Contact #:    2. Clinician Name:    Phone:      Clinician Pager or Emergency Contact #:     3. Suicide Prevention Lifeline: 3-327-933-TALK (3526)    4. [address] [place] [address] Emergency Services -  for example, [facility], local county suicide hotline, [facility] Hotline: [phone]      Emergency Services Address: [facility] [address]      Emergency Services Phone: [phone]    Making the environment safe: How can I make my environment (house/apartment/living space) safer? For example, can I remove guns, medications, and other items? 1. Nothing at this time  2.

## 2021-03-31 PROCEDURE — 65220000003 HC RM SEMIPRIVATE PSYCH

## 2021-03-31 PROCEDURE — 74011250637 HC RX REV CODE- 250/637: Performed by: PSYCHIATRY & NEUROLOGY

## 2021-03-31 RX ORDER — QUETIAPINE FUMARATE 200 MG/1
800 TABLET, FILM COATED ORAL
Status: DISCONTINUED | OUTPATIENT
Start: 2021-03-31 | End: 2021-04-05 | Stop reason: HOSPADM

## 2021-03-31 RX ADMIN — QUETIAPINE FUMARATE 800 MG: 200 TABLET ORAL at 21:20

## 2021-03-31 RX ADMIN — DIVALPROEX SODIUM 1500 MG: 500 TABLET, EXTENDED RELEASE ORAL at 21:20

## 2021-03-31 NOTE — BH NOTES
Affect blunted, mood depressed. Pt attended and participated in scheduled group activities. Pt was social and appropriate with staff and peers. Pt interacted well. Pt denies SI/HI/AVH/pain. Pt ate 100% of snack. Pt compliant with medications and did not request a PRN. Pt was not delusional this evening and was able to have \"normal\" conversations with staff and peers. Pt was very encouraging to other peers. Pt is in no apparent distress at this time and made no delusional statements. Pt rested in his bed with his eyes closed for 6.25 hours.

## 2021-03-31 NOTE — BH NOTES
Behavioral Health Interdisciplinary Rounds     Patient Name: Kandace Clarke  Age: 48 y.o. Room/Bed:  234/01  Primary Diagnosis: Bipolar affective disorder, manic, severe, with psychotic behavior [facility]   Admission Status: Involuntary Commitment     Readmission within 30 days: no  Power of  in place: no  Patient requires a blocked bed: no          Reason for blocked bed:     VTE Prophylaxis: Not indicated    Mobility needs/Fall risk: yes  Flu Vaccine : no   Nutritional Plan: no  Consults: no         Labs/Testing due today?: no    Sleep hours: 7.25       Participation in Care/Groups:  yes  Medication Compliant?: Yes  PRNS (last 24 hours): None    Restraints (last 24 hours):  no     CIWA (range last 24 hours):     COWS (range last 24 hours):      Alcohol screening (AUDIT) completed -   AUDIT Score: 2     If applicable, date SBIRT discussed in treatment team AND documented:   AUDIT Screen Score: AUDIT Score: 2      Document Brief Intervention (corresponds directly with the 5 A's, Ask, Advise, Assess, Assist, and Arrange): At- Risk Patients (Score 7-15 for women; 8-15 for men)  Discuss concern patient is drinking at unhealthy levels known to increase risk of alcohol-related health problems. Is Patient ready to commit to change? If No:   Encourage reflection   Discuss short term and long term health risks of consuming alcohol   Barriers to change   Reaffirm willingness to help / Educational materials provided  If Yes:   Set goal  [employer] provided    Harmful use or Dependence (Score 16 or greater)   Discuss short term and long term health risks of consuming alcohol   Recommendations   Negotiate drinking goal   Recommend addiction specialist/center   Arrange follow-up appointments.     Tobacco - patient is a smoker: Have You Used Tobacco in the Past 30 Days: Yes  Illegal Drugs use: Have You Used Any Illegal Substances Over the Past 12 Months: No    24 hour chart check complete: yes     Patient goal(s) for today: to finish repairs on house/truck  Treatment team focus/goals: decrease delusional thinking  Progress note he has been social and appropriate with staff and peers not as delusional    LOS:  5  Expected LOS: 5    Financial concerns/prescription coverage:  no  Family contact: no      Family requesting physician contact today:  no  Discharge plan: home  Access to weapons : no        [facility]  Patient's preferred phone number for follow up call : [phone]    Participating treatment team members: Karen Del Cid, * (assigned SW), Ruthie Sidhu

## 2021-03-31 NOTE — GROUP NOTE
[facility]  GROUP DOCUMENTATION INDIVIDUAL Group Therapy Note Date: 3/30/2021 Group Start Time: 0800 Group End Time: 0830 Group Topic: [employer] [address] Vance Ludwig [facility] GROUP DOCUMENTATION GROUP Group Therapy Note Attendees: 5 Attendance: Attended Patient's Goal:  Pt.'s goal is to finish repairs on his mother's house and to get his truck repaired/fixed. Interventions/techniques: Promoted peer support and Provide feedback Follows Directions: Followed directions Interactions: Interacted appropriately Mental Status: Calm Behavior/appearance: Attentive and Cooperative Goals Achieved: Able to engage in interactions, Able to listen to others and Able to give feedback to another Additional Notes:  Pt. Has no pain. He states he does not have any anxiety or depression. Pt. Has no thoughts of self harm or harm to others. Zarina Mchugh

## 2021-03-31 NOTE — GROUP NOTE
[facility] GROUP DOCUMENTATION INDIVIDUAL Group Therapy Note Date: 3/31/2021 Group Start Time: 0900 Group End Time: 7979 Group Topic: Process Group - Inpatient [address] OP Tucker Ingram [facility] GROUP DOCUMENTATION GROUP Group Therapy Note Focus of session was a focus on identifying for group members their self-defeating habits and where they may have come from. We focused on the impact of these habits and thought processes and how to cope and challenge them. We went over the following specific habits that impact us including feeling guilt that is inappropriate, or unwarranted, thinking that we are a failure, being a perfectionist, living with regret, comparing yourself negatively to others, and people pleasing. We discussed how challenging these habits can help us build up or confidence as well as lighten our emotional load that we are carrying. Attendance: Attended Patient's Goal:  
  
Patient will verbalize issues that get in their way of progress Interventions/techniques: Informed, Validated, Provide feedback, Reinforced and Supported Follows Directions: Followed directions Interactions: Interacted appropriately Mental Status: Calm and Congruent Behavior/appearance: Attentive, Caretaking, Cooperative, Motivated and Neatly groomed Goals Achieved: Able to engage in interactions, Able to listen to others, Able to give feedback to another, Able to reflect/comment on own behavior, Able to receive feedback, Able to experience relief/decrease in symptoms, Able to self-disclose, Discussed coping, Discussed discharge plans, Identified feelings, Identified triggers and Identified resources and support systems Additional Notes:  Pt was attentive and cooperative in group. He appears to have established some \"rapport\" with the other group members.   He engaged in the conversation related to self-doubt and self-defeating behaviors. He is quick to share his past experiences and what strategies he feels they need to try. He stated he believes it is important to\" let things go\" after trying to deal with them. Arbutus Foil

## 2021-03-31 NOTE — BH NOTES
Spoke with Luca Bennett from [facility] who will be authorizing and monitoring his stay for project authorization. She requested nurses,Doctors notes and history and physical. All notes were faxed as requested.

## 2021-03-31 NOTE — PROGRESS NOTES
Problem: Psychosis  Goal: *STG/LTG: Complies with medication therapy  Outcome: Progressing Towards Goal    Pt proactive in understanding medication and schedule. Asking for clarification of medication names.

## 2021-03-31 NOTE — PROGRESS NOTES
INTERVAL Hx:  Records and clinical information were reviewed. Still reports that he's feeling \"better\", although he also still has some hypomanic sx's clearly evident. He's been pressured about wanting everything taken care of instantly, especially with his Tx at the [facility] (who he has called). He states his sleep was very interrupted last night and he'd like to have the Quetiapine increased to help with that. He's definitely not as overtly psychotic as he had been, but his reality testing is still very impaired. No SE's with the meds so far including the increased dose of Seroquel--no sedation, EPSE's, or TD sx's. Slept 6.75 hours last night, but not consistently. VPA level: 76. MEDS:  Current Facility-Administered Medications   Medication Dose Route Frequency    QUEtiapine (SEROquel) tablet 800 mg  800 mg Oral QHS    hydrocortisone (ANUSOL-HC) 2.5 % rectal cream   PeriANAL BID PRN    magnesium hydroxide (MILK OF MAGNESIA) 400 mg/5 mL oral suspension 30 mL  30 mL Oral DAILY PRN    haloperidol lactate (HALDOL) injection 5 mg  5 mg IntraMUSCular Q6H PRN    hydrOXYzine pamoate (VISTARIL) capsule 50 mg  50 mg Oral Q6H PRN    traZODone (DESYREL) tablet 50 mg  50 mg Oral QHS PRN    alum-mag hydroxide-simeth (MYLANTA) oral suspension 30 mL  30 mL Oral Q4H PRN    nicotine (NICODERM CQ) 21 mg/24 hr patch 1 Patch  1 Patch TransDERmal DAILY PRN    acetaminophen (TYLENOL) tablet 650 mg  650 mg Oral Q4H PRN    ibuprofen (MOTRIN) tablet 400 mg  400 mg Oral Q6H PRN    divalproex ER (DEPAKOTE ER) 24 hour tablet 1,500 mg  1,500 mg Oral QHS       VITALS:   Patient Vitals for the past 12 hrs:   Temp Pulse Resp BP SpO2   03/31/21 0726 (!) 96.2 °F (35.7 °C) 80 19 (!) 141/77 98 %       LABS: .  No results found for this or any previous visit (from the past 24 hour(s)).     MSE:   Appearance: casually dressed; adequately groomed  Behavior: less restless; no agitation  Motor: mostly normal  Mood/Affect: less labile--still elevated  Thought Process: tangential; often disorganized  Thought Content: still some Catholic and grandiose thoughts; no SI/HI  Perceptions: no apparent hallucinations  Insight/Judgment: very limited    ASSESSMENT:   1. Bipolar Disorder, manic, with psychosis (F31.2)    PLAN:  1. Increase the Seroquel to 800 mg QHS for now. 2. Continue the VPA ER at 1500 mg QHS. 3. ELOS: 5-9 days.

## 2021-03-31 NOTE — PROGRESS NOTES
Problem: Psychosis  Goal: *STG: Participates in individual and group therapy  Outcome: Progressing Towards Goal

## 2021-03-31 NOTE — BH NOTES
Pt up in room this am dressed, bed made, and awaiting breakfast.  Affect blunted, mood irritable related to medication and lack of sleep. Speech pressured and wanting to talk about sleep medication. Denies SI, HI, and  Hallucinations. Appetite is good. Attends group and participates. Visible in unit and making his needs known.

## 2021-03-31 NOTE — ROUTINE PROCESS
Shift change report given to Niall Chairez RN re: SBAR, medications, and behavior. Susana fall risk score 2.

## 2021-03-31 NOTE — GROUP NOTE
[facility] GROUP DOCUMENTATION INDIVIDUAL Group Therapy Note Date: 3/31/2021 Group Start Time: 7783 Group End Time: 1639 Group Topic: [employer] [address] 
 
[facility] GROUP DOCUMENTATION GROUP Group Therapy Note Attendees: 3 Attendance: Attended Patient's Goal:  Go home Interventions/techniques: Supported Follows Directions: Followed directions Interactions: Interacted appropriately Mental Status: Calm and Happy Behavior/appearance: Cooperative and Motivated Goals Achieved: Able to engage in interactions, Able to listen to others and Able to give feedback to another Additional Notes:  Yasemin Edrodriguez was out for the Group Meeting. He stated that he is having a good day, ate well, and slept good last night. He is having a good day cleaning room and opening up to peers in group. Hard time dealing with not being able to go home yet. He stated he has no thoughts of hurting anyone nor himself and is having lower back pain of a 2.5. nurse notified. Christina Valera CNA

## 2021-04-01 PROCEDURE — 65220000003 HC RM SEMIPRIVATE PSYCH

## 2021-04-01 PROCEDURE — 74011250637 HC RX REV CODE- 250/637: Performed by: PSYCHIATRY & NEUROLOGY

## 2021-04-01 RX ADMIN — QUETIAPINE FUMARATE 800 MG: 200 TABLET ORAL at 21:09

## 2021-04-01 RX ADMIN — DIVALPROEX SODIUM 1500 MG: 500 TABLET, EXTENDED RELEASE ORAL at 21:08

## 2021-04-01 NOTE — BH NOTES
SOCIAL WORK PROGRESS NOTE    NAME:Yang Ramirez  : 1970  MRN: [id number]      Patient presentation including presence/absence SI/HI, psychosis, ability to perform ADLs: patient denies SI/HI is not expressing overt delusions, he is participating in groups, appropriate with peers and staff, he feels better each day and not as hyper still talks fast    Concerns expressed by patient: would like his sleep to improve    Family involvement: no family    [facility]    Strengths: educated, able to care for self    Limitations: insight is limited    Other: spoke with his psychiatrist yesterday from the [facility]

## 2021-04-01 NOTE — BH NOTES
Shift change report given to [facility] NaknekJOSE M PARRISH re: SBAR, medications, and behavior.   Susana fall risk score: (2)

## 2021-04-01 NOTE — GROUP NOTE
[facility]  GROUP DOCUMENTATION INDIVIDUAL Group Therapy Note Date: 4/1/2021 Group Start Time: 0900 Group End Time: 8837 Group Topic: Process Group - Inpatient [address] OP Dameon Morales [facility]  GROUP DOCUMENTATION GROUP Group Therapy Note Focus of session was on the quote Each Day I Remember and we went over it and that it included the fact that each day we have choices, that taking care of ourselves can be our first choice, that it is okay to say no when necessary and to stand up for myself, that I don't have to please others or be everything to everyone, I can be honest and still kind and stick boundaries and stick with them, as well as I can honor myself and that is my responsibility and right to do so. We spoke about how this can be a quote that can help push us in the direction of self-care and we spoke about what stood out in particular to group members about self-care. Attendance: Attended Patient's Goal: 
  
Patient will develop strategies to regulate emotions and corresponding behaviors Interventions/techniques: Informed, Provide feedback, Reinforced and Supported Follows Directions: Followed directions Interactions: Interacted appropriately Mental Status: Anxious, Elevated and Preoccupied Behavior/appearance: Agitated, Disheveled and Negative Goals Achieved: Able to engage in interactions, Able to listen to others, Able to give feedback to another, Able to self-disclose, Discussed coping and Discussed discharge plans Additional Notes:  Pt was cooperative in group and engaged with the other group members. Pt stated at the beginning that he was exhausted and irritated as he got a new roommate last night and did not get any sleep. He stated he is ready to go home but wasn't sure when he would be able to leave.  Pt did discuss the activity and  he provided suggestions regarding the discharge process  to two of the group members that were going home today. Andrew Yousif

## 2021-04-01 NOTE — BH NOTES
Affect blunted, mood depressed. Pt attended and participated in scheduled group activities. Pt was social and appropriate with staff and peers. Pt denies SI/HI/AVH/pain. Pt ate 100% of snack. Pt compliant with medications and did not request a PRN. Pt is in no apparent distress at this time and made no delusional statements. Pt was agitated when he got a roommate. Pt stated this morning that he was very upset that he was woken up during the new admission, and when his roommate had to have lab work drawn this morning. Pt rested in his bed with his eyes closed for 7 hours.

## 2021-04-01 NOTE — PROGRESS NOTES
INTERVAL Hx:  Records and clinical information were reviewed. States he thinks he's feeling a little better each day and that he notices that he's not as hyper. However, he's still somewhat labile and still talks very quickly, often to the point of being hard to understand. He has been sleeping better and he denies having any SE's with the increased dose of Seroquel, so far. There have been no overt delusions expressed either, and we began discussing possible D/C plans. Reality testing is still somewhat impaired, however. No SE's with the meds so far including the increased dose of Seroquel--no sedation, EPSE's, or TD sx's. Slept 7 hours last night. MEDS:  Current Facility-Administered Medications   Medication Dose Route Frequency    QUEtiapine (SEROquel) tablet 800 mg  800 mg Oral QHS    hydrocortisone (ANUSOL-HC) 2.5 % rectal cream   PeriANAL BID PRN    magnesium hydroxide (MILK OF MAGNESIA) 400 mg/5 mL oral suspension 30 mL  30 mL Oral DAILY PRN    haloperidol lactate (HALDOL) injection 5 mg  5 mg IntraMUSCular Q6H PRN    hydrOXYzine pamoate (VISTARIL) capsule 50 mg  50 mg Oral Q6H PRN    traZODone (DESYREL) tablet 50 mg  50 mg Oral QHS PRN    alum-mag hydroxide-simeth (MYLANTA) oral suspension 30 mL  30 mL Oral Q4H PRN    nicotine (NICODERM CQ) 21 mg/24 hr patch 1 Patch  1 Patch TransDERmal DAILY PRN    acetaminophen (TYLENOL) tablet 650 mg  650 mg Oral Q4H PRN    ibuprofen (MOTRIN) tablet 400 mg  400 mg Oral Q6H PRN    divalproex ER (DEPAKOTE ER) 24 hour tablet 1,500 mg  1,500 mg Oral QHS       VITALS:   Patient Vitals for the past 12 hrs:   Temp Pulse Resp BP SpO2   04/01/21 0730 97.2 °F (36.2 °C) 100 19 103/68 98 %       LABS: .  No results found for this or any previous visit (from the past 24 hour(s)).     MSE:   Appearance: casually dressed; adequately groomed  Behavior: less restless; no agitation  Motor: mostly normal  Mood/Affect: less labile--still elevated  Thought Process: less tangential and disorganized  Thought Content: still some Quaker and grandiose thoughts; no SI/HI  Perceptions: no apparent hallucinations  Insight/Judgment: very limited    ASSESSMENT:   1. Bipolar Disorder, manic, with psychosis (F31.2)    PLAN:  1. Continue the Seroquel at 800 mg QHS for now. 2. Continue the VPA ER at 1500 mg QHS. 3. ELOS: 5-8 days.

## 2021-04-01 NOTE — GROUP NOTE
[facility] GROUP DOCUMENTATION INDIVIDUAL Group Therapy Note Date: 3/31/2021 Group Start Time: 2000 Group End Time: 2100 Group Topic: [employer] LUCIO Alvarez GROUP DOCUMENTATION GROUP Group Therapy Note Attendees: 5/6 Attendance: Attended Patient's Goal:  He wants to get focused and get her medication adjusted so he can sleep more throughout the night. He wants to keep the lines open through here and the [place] to get his things situated. Interventions/techniques: Supported Follows Directions: Followed directions Interactions: Interacted appropriately Mental Status: Calm Behavior/appearance: Cooperative Goals Achieved: Able to engage in interactions, Able to listen to others, Able to give feedback to another, Able to reflect/comment on own behavior, Able to manage/cope with feelings and Able to receive feedback Additional Notes:  He has no anxiety, depression, or pain at this time. Daryn Baum CNA

## 2021-04-01 NOTE — ROUTINE PROCESS
Shift change report given to Annamarie Oleary Rn, re: SBAR. Medications, and behavior. Susana Fall Risk Score (2)

## 2021-04-01 NOTE — BH NOTES
Affect blunted, mood labile. Patient is alert and oriented with some disorganize thinking. Patient cooperative and pleasant. Patient continues to be hyper talkative at times. No agitation or aggression noted. Patient denies SI/HI/AVH/pain. No delusional statements made. Patient still have some Mandaen and grandiose thoughts. No scheduled medications ordered or PRN's given. Patient appetite good and eats 100% of all meals plus snacks. Patient attended and engaged in groups with prompting. Patient wearing his face mask and adhering to social distancing. Patient in no apparent distress all vital signs within normal limits.

## 2021-04-01 NOTE — PROGRESS NOTES
Problem: Psychosis  Goal: *STG: Accept constructive criticism without injury or isolation  Outcome: Progressing Towards Goal  Goal: *STG/LTG: Complies with medication therapy  Outcome: Progressing Towards Goal

## 2021-04-01 NOTE — ROUTINE PROCESS
Shift change report given to Marian Alcantara Rn, re: SBAR. Medications, and behavior. Susana Fall Risk Score (2)

## 2021-04-01 NOTE — PROGRESS NOTES
Problem: Risk of Harm to Self or Others  Goal: *LTG: Denial of suicidal ideation or intent for at least 2 days  Outcome: Resolved/Met  Goal: *LTG: Denial of assualtive or homicidal ideation or intent for at least 2 days  Outcome: Resolved/Met  Goal: *LTG: Demonstrate ability to manage frustration or anger  Description: Demonstrate ability to manage frustration or anger without aggressive behavior for 3 consecutive days in therapeutic milieu. Outcome: Resolved/Met  Goal: STG: Patient will limit number of statements of suicidal ideation or intent per day  Description: Patient will limit number of statements of suicidal ideation or intent per day. Indicate number of statements/day. Outcome: Resolved/Met  Goal: *STG: Patient will limit statements of assaultive/homicidal ideation or intent  Description: Patient will limit statements of assaultive/homicidal ideation or intent. Indicate number of statements per day.   Outcome: Resolved/Met  Goal: *STG: Patient will identify 2 alternative ways to cope with suicidal or self-harm feelings  Outcome: Resolved/Met  Goal: *STG: Patient will identify 2 alternative ways to cope with assaultive/homicidal feelings  Outcome: Resolved/Met

## 2021-04-01 NOTE — GROUP NOTE
[facility]  GROUP DOCUMENTATION INDIVIDUAL Group Therapy Note Date: 4/1/2021 Group Start Time: 2862 Group End Time: 1848 Group Topic: [employer] [address] [facility] GROUP DOCUMENTATION GROUP Group Therapy Note Attendees: 4 Attendance: Attended Patient's Goal:  To take some stress off his girlfriend Interventions/techniques: Provide feedback Follows Directions: Followed directions Interactions: Interacted appropriately Mental Status: Depressed Behavior/appearance: Attentive and Cooperative Goals Achieved: Able to engage in interactions and Able to listen to others Additional Notes:  Patient had no pain and no suicidal thoughts. Chayo Roca

## 2021-04-01 NOTE — PROGRESS NOTES
Problem: Falls - Risk of  Goal: *Absence of Falls  Description: Document [place] Fall Risk and appropriate interventions in the flowsheet.   Outcome: Progressing Towards Goal  Note: Fall Risk Interventions:       Mentation Interventions: Door open when patient unattended    Medication Interventions: Teach patient to arise slowly                   Problem: Psychosis  Goal: *STG: Remains safe in hospital  Outcome: Progressing Towards Goal  Goal: *STG: Seeks staff when feelings of self harm or harm towards others arise  Outcome: Progressing Towards Goal  Goal: *STG/LTG: Complies with medication therapy  Outcome: Progressing Towards Goal

## 2021-04-02 PROCEDURE — 74011250637 HC RX REV CODE- 250/637: Performed by: PSYCHIATRY & NEUROLOGY

## 2021-04-02 PROCEDURE — 65220000003 HC RM SEMIPRIVATE PSYCH

## 2021-04-02 RX ADMIN — DIVALPROEX SODIUM 1500 MG: 500 TABLET, EXTENDED RELEASE ORAL at 21:09

## 2021-04-02 RX ADMIN — QUETIAPINE FUMARATE 800 MG: 200 TABLET ORAL at 21:09

## 2021-04-02 NOTE — GROUP NOTE
[facility] GROUP DOCUMENTATION INDIVIDUAL Group Therapy Note Date: 4/2/2021 Group Start Time: 1400 Group End Time: 7212 Group Topic: Process Group - Inpatient [address] OP Yani Meek 
 
[facility] GROUP DOCUMENTATION GROUP Group Therapy Note Attendees: Focus of session was based on information in the book [facility] to Stop Overthinking by [place]. Nalini and Laquita Kevin. I shared with group members the signs that a person is an overthinker which includes: insomnia, living anxiously (have to plan out and be prepared for everything), overanalyzing everything around you, fear of failure (perfectionism), second guessing self, headaches, sore muscles and stiff joints, fatigue, and cannot be present in the moment. We spoke about how these above signs then affect our day to day living and decreases our overall well-being. I shared strategies from the book that included: being self-aware and noting your thoughts, challenging your thoughts as necessary, focus on problem solving, increasing mindfulness practice, and learn how to change the channel. Attendance: Attended Patient's Goal:   
  
Patient will develop strategies to regulate emotions and corresponding behaviors Interventions/techniques: Informed, Validated, Promoted peer support, Provide feedback and Supported Follows Directions: Followed directions Interactions: Interacted appropriately Mental Status: Calm, Congruent and Preoccupied Behavior/appearance: Attentive and Cooperative Goals Achieved: Able to engage in interactions, Able to listen to others, Able to give feedback to another, Able to reflect/comment on own behavior, Able to receive feedback, Able to self-disclose, Discussed coping, Identified feelings, Identified triggers and Identified relapse prevention strategies Additional Notes:  Latoya Tarango participated in group with prompting. He spoke about how he is doing better overall and he is ready to go home to be able to work on his house. He spoke about how he likes to write things down in order to process his thoughts. He said he has to write things down in order to let go of his negative thoughts. He was very cooperative in group and able to engage well with others. Karli Fierro

## 2021-04-02 NOTE — PROGRESS NOTES
INTERVAL Hx:  Records and clinical information were reviewed. Seems to be slowly but steadily stabilizing, although he's still hypomanic much of the time--can be hypertalkative and easily irritated and impulsive, with poor judgment. However, he's not overt;ly delusional now and his thoughts are more organized and coherent. Says he notices that he's feeling a little better each day and that he's not nearly as hyper. Reality testing is still somewhat impaired, but slowly improving as well. No SE's with the meds so far including the increased dose of Seroquel--no sedation, EPSE's, or TD sx's. Slept 6 hours last night. MEDS:  Current Facility-Administered Medications   Medication Dose Route Frequency    QUEtiapine (SEROquel) tablet 800 mg  800 mg Oral QHS    hydrocortisone (ANUSOL-HC) 2.5 % rectal cream   PeriANAL BID PRN    magnesium hydroxide (MILK OF MAGNESIA) 400 mg/5 mL oral suspension 30 mL  30 mL Oral DAILY PRN    haloperidol lactate (HALDOL) injection 5 mg  5 mg IntraMUSCular Q6H PRN    hydrOXYzine pamoate (VISTARIL) capsule 50 mg  50 mg Oral Q6H PRN    traZODone (DESYREL) tablet 50 mg  50 mg Oral QHS PRN    alum-mag hydroxide-simeth (MYLANTA) oral suspension 30 mL  30 mL Oral Q4H PRN    nicotine (NICODERM CQ) 21 mg/24 hr patch 1 Patch  1 Patch TransDERmal DAILY PRN    acetaminophen (TYLENOL) tablet 650 mg  650 mg Oral Q4H PRN    ibuprofen (MOTRIN) tablet 400 mg  400 mg Oral Q6H PRN    divalproex ER (DEPAKOTE ER) 24 hour tablet 1,500 mg  1,500 mg Oral QHS       VITALS:   Patient Vitals for the past 12 hrs:   Temp Pulse Resp BP SpO2   04/02/21 0728 (!) 96.2 °F (35.7 °C) 73 16 117/75 97 %       LABS: .  No results found for this or any previous visit (from the past 24 hour(s)).     MSE:   Appearance: casually dressed; adequately groomed  Behavior: less restless; no agitation  Motor: mostly normal  Mood/Affect: less labile--still elevated  Thought Process: more organized  Thought Content: still some Adventism and grandiose thoughts; no SI/HI  Perceptions: no apparent hallucinations  Insight/Judgment: very limited    ASSESSMENT:   1. Bipolar Disorder, manic, with psychosis (F31.2)    PLAN:  1. Continue the Seroquel at 800 mg QHS for now. 2. Continue the VPA ER at 1500 mg QHS. 3. ELOS: 4-7 days. I certify that the inpatient psychiatric hospital services furnished since the previous certification/re-certification were, and continue to be, medically necessary for treatment which could reasonably be expected to improve the patients condition and/or for diagnostic study. This patient continues to need, on a daily basis, active treatment furnished directly by or under the supervision of inpatient psychiatric personnel.

## 2021-04-02 NOTE — PROGRESS NOTES
Problem: Psychosis  Goal: *STG: Decreased delusional thinking  Outcome: Progressing Towards Goal  Note: Affect blunted. Mood anxious/labile. Alert and oriented x 4. Delusions decreased but still shows evidence of paranoia. Did not want anyone in dayroom to know his personal business. Talked with staff and attended group activities as active participant. Hygiene good. Med compliant and able to verbalize med dose. Denies SI/HI/AVH/pain. Helpful and concerned with others.

## 2021-04-02 NOTE — GROUP NOTE
[facility]  GROUP DOCUMENTATION INDIVIDUAL Group Therapy Note Date: 4/2/2021 Group Start Time: 0872 Group End Time: 1100 Group Topic: [employer] Shayan Pizano [facility]  GROUP DOCUMENTATION GROUP Group Therapy Note Attendees: 3 Attendance: Attended Patient's Goal:  Take his medication Interventions/techniques: Provide feedback Follows Directions: Followed directions Interactions: Interacted appropriately Mental Status: Calm Behavior/appearance: Cooperative Goals Achieved: Able to listen to others Additional Notes:  Patient appetite was good, no physical pain, no suicidal thoughts. Patient stated he has no depression or anxiety. Dolly Dennis

## 2021-04-02 NOTE — GROUP NOTE
[facility] GROUP DOCUMENTATION INDIVIDUAL Group Therapy Note Date: 4/1/2021 Group Start Time: 2000 Group End Time: 2045 Group Topic: [employer] Nallely Timmons 
 
[facility] GROUP DOCUMENTATION GROUP Group Therapy Note Attendees: 4 Attendance: Attended Patient's Goal:  Go home Interventions/techniques: Supported Follows Directions: Followed directions Interactions: Interacted appropriately Mental Status: Calm and Happy Behavior/appearance: Cooperative Goals Achieved: Able to engage in interactions, Able to listen to others and Able to give feedback to another Additional Notes:  Henna Damon was out for the Wrap-Up Group tonight. She stated that he had an exhausting day because of being woke up with a room mate at am and took him awhile to settle down, he ate good and hopes tonight he will sleep good. He states he is having no anxiety nor depression. He had a good time watching movies and laughing with peers, but a bad time with the TV being on News he said the staff kept changing the TV to news which was crazy we wanted to watch other things and she wanted to watch that and kept changing back. He has no thoughts of hurting anyone nor himself and is in no pain at this time. Matthew Correia CNA

## 2021-04-02 NOTE — BH NOTES
Affect blunted. Mood anxious/labile. Alert and oriented x 4. Delusions decreased but still shows evidence of paranoia. Did not want anyone in dayroom to know his personal business. Talked with staff and attended group activities as active participant. Hygiene good. Med compliant and able to verbalize med dose. Denies SI/HI/AVH/pain. Helpful and concerned with others. Adhering to mask protocol. Played games with staff. Good attention span. Ate all of meals.

## 2021-04-02 NOTE — BH NOTES
Affect blunted, mood anxious. Pt attended and participated in scheduled group activities. Pt was social and appropriate with peers but was a little more intrusive with poor boundaries around staff. Pt denies SI/HI/AVH/pain but is still mildly manic in his behaviors and conversations. Pt continues to focus on his medications. Pt woke up during the night stating he wanted some \"valium or something\", Pt was advised that he did not have any order for that medication and he would need to speak the the doctor in the morning. Pt ate 100% of snack. Pt compliant with medications and did not request a PRN. Pt is in no apparent distress at this time and made no delusional statements. Pt rested in his bed with his eyes closed for 6 hours.

## 2021-04-03 PROCEDURE — 65220000003 HC RM SEMIPRIVATE PSYCH

## 2021-04-03 PROCEDURE — 74011250637 HC RX REV CODE- 250/637: Performed by: PSYCHIATRY & NEUROLOGY

## 2021-04-03 RX ADMIN — QUETIAPINE FUMARATE 800 MG: 200 TABLET ORAL at 21:03

## 2021-04-03 RX ADMIN — DIVALPROEX SODIUM 1500 MG: 500 TABLET, EXTENDED RELEASE ORAL at 21:03

## 2021-04-03 NOTE — GROUP NOTE
[facility]  GROUP DOCUMENTATION INDIVIDUAL Group Therapy Note Date: 4/2/2021 Group Start Time: 2000 Group End Time: 2030 Group Topic: [employer] [address] Jillian Du 
 
[facility]  GROUP DOCUMENTATION GROUP Group Therapy Note Attendees: 4/5 Attendance: Attended Patient's Goal:  To talk to the DR tomorrow and have good results Interventions/techniques: Supported Follows Directions: Followed directions Interactions: Interacted appropriately Mental Status: Calm Behavior/appearance: Attentive and Cooperative Goals Achieved: Able to engage in interactions, Able to listen to others, Able to give feedback to another, Able to reflect/comment on own behavior, Able to manage/cope with feelings, Able to receive feedback and Able to self-disclose Additional Notes:  PT reported no anxiety, depression, pain, or SI.  
 
Nicho Tovar

## 2021-04-03 NOTE — MASTER TREATMENT PLAN
Master Treatment Plan Review for Trevor Bagley Date of Admission Date Treatment Plan Reviewed Anticipated Date of Discharge Legal Status 03/26/2021 04/03/2021 TBD Involuntary Commitment Treatment & Discharge Planning Changes Modality or Intervention Changes N/A Psychotropic Medication Changes 03/31/2021 - Seroquel 800 mg PO QHS Discharge Planning or Target Date Changes ELOS: 4 - 7 days New Issues N/A Treatment Team Signatures I have participated in the development of this plan of treatment and agree to its implementation. Patient Signature Patient Printed Name Date/Time /Therapist Signature //Therapist Printed Name Date/Time RN Signature RN Printed Name Daniel Hamman, RN Date/Time 04/03/21/03/21/0111 Unit  Signature Unit  Printed Name Date/Time MD Signature MD Printed Name Date/Time

## 2021-04-03 NOTE — BH NOTES
Affect blunted, mood depressed/anxious with improvement. Attended and participated in group. Ate snacks. Interacting appropriately with staff and peers. Denied SI/HI/AVH/pain. Watched movie with peers. Medication compliant. Pleasant and co-operative. Pt rested quietly in bed with eyes closed for 6.75 hours. Respirations even and unlabored. Pt in no apparent distress.

## 2021-04-03 NOTE — PROGRESS NOTES
Subjective:  Slept 7 hours. MUCH MORE AFFECT   NO SI/HI/AVH. NO SIDE EFFECTS OF MEDICATIONS. \"I SLEEP BETTER. \"  \"I KEEP MYSELF BUSY. IT HELPS ME.\"        Objective:   Vitals: stable and within normal limits  Labs: UDS NEGATIVE, BMP WNL, CBC WBC 14.1, TSH 1.05, HA1C 5.3, FLP WNL,         Mental Status Exam:  Appearance: CASUALLY DRESSED  Behavior: COOPERATIVE ,GOOD EYE CONTACT  Motor: no psychomotor agitation or retardation noted  Speech:NORMAL RATE AND VOLUME  Mood: OK\"  Affect: STABLE  Thought Content: NO SI/HI/AVH,   Thought Process: LINEAR AND GOAL ORIENTED  Orientation: A&O X 4  Insight/Judgment: FAIR X 2     Assessment:  Axis I: BPAD I, MANIC WITH PSYCHOTIC FEATURES       Plan:  CONT TREATMENT PLAN  DOING WELL WITH HIGHER DOSES OF MEDICATIONS     REASON FOR CONTINUED STAY: DC PLANNING        1. I certify that the patient needs 24 hours of medical supervision to improve the above conditions. 2. The current mental illness is classified as severe. 3. Outpatient services only are insufficient currently to treat this patient's current psychiatric condition. 4. There is continued need for psychiatric inpatient treatment to address the above condition. 5. I certify that current psychiatric treatment could be reasonably expected to improve the patient's condition. 6. I certify that the patient should expect to make improvements as a result of inpatient psychiatric services  7. The risks and benefits of treatment were discussed with the patient.

## 2021-04-03 NOTE — GROUP NOTE
[facility]  GROUP DOCUMENTATION INDIVIDUAL Group Therapy Note Date: 4/3/2021 Group Start Time: 6638 Group End Time: 1100 Group Topic: [employer] 1 Shavonne Maavery Kyron Birgit Rene [facility] GROUP DOCUMENTATION GROUP Group Therapy Note Attendees: 4 Attendance: Attended Patient's Goal:  Pt.'s goal is to work, eat right and have an emotional balance. Interventions/techniques: Informed and Provide feedback Follows Directions: Followed directions Interactions: Interacted appropriately Mental Status: Calm Behavior/appearance: Cooperative Goals Achieved: Able to engage in interactions, Able to listen to others and Able to give feedback to another Additional Notes:  Pt. States he does not have any pain. He states he does not have any anxiety or depression. Pt. Does not have any thoughts of self harm or harm to others. Hermes Mayorga

## 2021-04-03 NOTE — PROGRESS NOTES
Problem: Psychosis    Goal: *STG: Decreased hallucinations    Affect blunted, mood depressed/anxious with improvement. Denied AVH. No overt evidence of AVH. No delusional statements. Assessed/monitored pt for signs/symptoms of thought disorder. Encouraged sharing of feelings. Monitored medication compliance and effectiveness. Encouraged focus on reality. Monitored pt's daily level of functioning. Offered encouragement, reassurance, and support.     Outcome: Progressing Towards Goal

## 2021-04-03 NOTE — PROGRESS NOTES
Problem: Psychosis  Goal: *STG: Remains safe in hospital  Outcome: Progressing Towards Goal  Note: Affect blunted. Mood anxious. Speech linear and unremarkable. Denies SI/HI/AVH/pain. Ate all of meals. Adhering to mask protocol. Worked on art therapy.

## 2021-04-03 NOTE — BH NOTES
Affect blunted. Mood anxious. Speech linear and unremarkable. Denies SI/HI/AVH/pain. Ate all of meals. Adhering to mask protocol. Worked on art therapy. Talked with girlfriend today. Likes to be helpful. Med compliant without SE's.

## 2021-04-04 PROCEDURE — 74011250637 HC RX REV CODE- 250/637: Performed by: PSYCHIATRY & NEUROLOGY

## 2021-04-04 PROCEDURE — 65220000003 HC RM SEMIPRIVATE PSYCH

## 2021-04-04 RX ADMIN — HYDROCORTISONE: 25 CREAM TOPICAL at 11:16

## 2021-04-04 RX ADMIN — DIVALPROEX SODIUM 1500 MG: 500 TABLET, EXTENDED RELEASE ORAL at 21:05

## 2021-04-04 RX ADMIN — QUETIAPINE FUMARATE 800 MG: 200 TABLET ORAL at 21:05

## 2021-04-04 NOTE — PROGRESS NOTES
Problem: Psychosis    Goal: *STG: Decreased hallucinations    Affect blunted, mood calm. Denied AVH. No overt evidence of AVH. No delusional statements. Assessed/monitored pt for signs/symptoms of thought disorder. Encouraged sharing of feelings. Monitored medication compliance and effectiveness. Monitored pt's daily level of functioning. Encouraged focus on reality. Provided encouragement, reassurance, and support.     Outcome: Progressing Towards Goal

## 2021-04-04 NOTE — GROUP NOTE
TUAN  GROUP DOCUMENTATION INDIVIDUAL Group Therapy Note Date: 4/3/2021 Group Start Time: 2000 Group End Time: 2030 Group Topic: [employer] David Smith TU[facility]  GROUP DOCUMENTATION GROUP Group Therapy Note Will Sampson Attendance: Attended Patient's Goal:  To work ,eat right and have an emotional balance . Interventions/techniques: Provide feedback Follows Directions: Followed directions Interactions: Interacted appropriately Mental Status: Calm Behavior/appearance: Cooperative Goals Achieved: Able to listen to others, Able to self-disclose and Identified feelings Additional Notes: Patient said he had a good day , by keeping buzy. .Patient said  His meds feel right . Patient said he has no pain ,no depression ,no anxiety and no SI . [facility]

## 2021-04-04 NOTE — BH NOTES
Alert and oriented x 4. Denies SI/HI/AVH/pain. Able to focus on task at hand for over an hour. Worked on art therapy and is helpful and cooperative. States he is \"looking forward to going home and becoming more productive. I know I need to take my meds. \"  Med compliant. Good hygiene. Active participant in group therapy.     PRN Medication Documentation    Specific patient behavior that led to need for PRN medication: hemorrhoid itching  Staff interventions attempted prior to PRN being given: assess need  PRN medication given: Anusol HC cream applied at 1116  Patient response/effectiveness of PRN medication:decreased itching

## 2021-04-04 NOTE — PROGRESS NOTES
Problem: Psychosis  Goal: *STG: Remains safe in hospital  Outcome: Progressing Towards Goal  Note: Alert and oriented x 4. Denies SI/HI/AVH/pain. Able to focus on task at hand for over an hour. Worked on art therapy and is helpful and cooperative. States he is \"looking forward to going home and becoming more productive. I know I need to take my meds. \"  Med compliant. Good hygiene. Active participant in group therapy.

## 2021-04-04 NOTE — PROGRESS NOTES
Subjective:  Slept 7 hours. MUCH MORE AFFECT   NO SI/HI/AVH. NO SIDE EFFECTS OF MEDICATIONS. PT IS HELPING AROUND THE UNIT. HE IS ALSO DOING SOME COLORING.         Objective:   Vitals: stable and within normal limits  Labs: UDS NEGATIVE, BMP WNL, CBC WBC 14.1, TSH 1.05, HA1C 5.3, FLP WNL,         Mental Status Exam:  Appearance: CASUALLY DRESSED  Behavior: COOPERATIVE ,GOOD EYE CONTACT  Motor: no psychomotor agitation or retardation noted  Speech:NORMAL RATE AND VOLUME  Mood: OK\"  Affect: STABLE  Thought Content: NO SI/HI/AVH,   Thought Process: LINEAR AND GOAL ORIENTED  Orientation: A&O X 4  Insight/Judgment: FAIR X 2     Assessment:  Axis I: BPAD I, MANIC WITH PSYCHOTIC FEATURES        Plan:  CONT TREATMENT PLAN  DOING WELL WITH HIGHER DOSES OF MEDICATIONS     REASON FOR CONTINUED STAY: DC PLANNING        1. I certify that the patient needs 24 hours of medical supervision to improve the above conditions. 2. The current mental illness is classified as severe. 3. Outpatient services only are insufficient currently to treat this patient's current psychiatric condition. 4. There is continued need for psychiatric inpatient treatment to address the above condition. 5. I certify that current psychiatric treatment could be reasonably expected to improve the patient's condition. 6. I certify that the patient should expect to make improvements as a result of inpatient psychiatric services  7.  The risks and benefits of treatment were discussed with the patient

## 2021-04-04 NOTE — BH NOTES
Affect blunted, mood depressed/anxious with improvement. Attended and participated in group. Ate snacks. Interacting appropriately with staff and peers. Denied SI/HI/AVH/pain. Watched movies with peers. Medication compliant. Pleasant and co-operative. Pt rested quietly in bed with eyes closed for 7.5 hours. Respirations even and unlabored. Pt in no apparent distress.

## 2021-04-04 NOTE — GROUP NOTE
[facility]  GROUP DOCUMENTATION INDIVIDUAL Group Therapy Note Date: 4/4/2021 Group Start Time: 3376 Group End Time: 3028 Group Topic: [employer] [address] Reina Bulls [facility]  GROUP DOCUMENTATION GROUP Group Therapy Note Attendees: 4 Attendance: Attended Patient's Goal:  To get organize to leave tomorrow. Interventions/techniques: Provide feedback Follows Directions: Followed directions Interactions: Interacted appropriately Mental Status: Anxious Behavior/appearance: Attentive and Cooperative Goals Achieved: Able to engage in interactions and Able to listen to others Additional Notes:  Patient had no pain and no suicidal thoughts. Brandee العلي

## 2021-04-05 VITALS
TEMPERATURE: 96.4 F | HEART RATE: 79 BPM | SYSTOLIC BLOOD PRESSURE: 101 MMHG | OXYGEN SATURATION: 98 % | WEIGHT: 175 LBS | RESPIRATION RATE: 16 BRPM | DIASTOLIC BLOOD PRESSURE: 64 MMHG | HEIGHT: 71 IN | BODY MASS INDEX: 24.5 KG/M2

## 2021-04-05 RX ORDER — DIVALPROEX SODIUM 500 MG/1
1500 TABLET, EXTENDED RELEASE ORAL
Qty: 90 TAB | Refills: 0 | Status: SHIPPED | OUTPATIENT
Start: 2021-04-05

## 2021-04-05 RX ORDER — QUETIAPINE FUMARATE 400 MG/1
800 TABLET, FILM COATED ORAL
Qty: 60 TAB | Refills: 0 | Status: SHIPPED | OUTPATIENT
Start: 2021-04-05

## 2021-04-05 NOTE — BH NOTES
Patient was discharged this date to home, he had a friend pick him up. He follows up with the [facility] [place] and will have a telephone call with Dr. Jesus Melendrez on April 7 at 9:30am. Patient was given a copy of his transition of care and a copy was sent to the [place].

## 2021-04-05 NOTE — BH NOTES
Discharge Note:      Patient discharged home today. Patient is alert and oriented x 4. Speech clear and coherent. Patient answers questions appropriately. Patient denies SI/HI/AVH/pain. No delusional statements made. Patient is compliant with medications. Patient appetite good ate 100% breakfast plus a snack. Patient in no apparent distress all vital signs within normal limits. RN reviewed discharge orders and instructions with patient which included medication drug name, purpose, doses, administration times, route, and adverse effects. Patient verbalized understanding and provided written copies of discharge orders and instructions along with prescriptions. Patient left the unit at 1130 ambulatory wearing a face mask and accompanied by staff. All personal valuables and belongings sent with patient.

## 2021-04-05 NOTE — BH NOTES
Affect blunted, mood depressed. Attended and participated in group. Ate snacks. Participated in the [employer] game with staff and peers. Denied SI/HI/AVH/pain. Interacting appropriately with staff and peers. Medication compliant. Pleasant and co-operative. Pt rested quietly in bed with eyes closed for 8 hours. Respirations even and unlabored. Pt in no apparent distress.

## 2021-04-05 NOTE — ROUTINE PROCESS
Shift change report given to Ratna Leija RN, re: SBAR, medications, and behavior. Susana Fall Risk Score - 2.

## 2021-04-05 NOTE — SUICIDE SAFETY PLAN
SAFETY PLAN     A suicide Safety Plan is a document that supports someone when they are having thoughts of suicide.     Warning Signs that indicate a suicidal crisis may be developing: What (situations, thoughts, feelings, body sensations, behaviors, etc.) do you experience that lets you know you are beginning to think about suicide? 1. none  2.   3.     Internal Coping Strategies:  What things can I do (relaxation techniques, hobbies, physical activities, etc.) to take my mind off my problems without contacting another person? 1. running  2. gardening  3. [address] working  [place] and social settings that provide distraction: Who can I call or where can I go to distract me? 1. Name: [facility]  Phone: [phone]  2. Name: Najma Finch                  Phone: [phone]   3. Place:            4. Place:      People whom I can ask for help: Who can I call when I need help - for example, friends, family, clergy, someone else? 1. Name: [facility]                 Phone: [phone]  2. Name:                     Phone:   3. Name:                     Phone:      Professionals or [facility] agencies I can contact during a crisis: Who can I call for help - for example, my doctor, my psychiatrist, my psychologist, a mental health provider, a suicide hotline? 1. Clinician Name: [place] Phone: [phone]      Clinician Pager or Emergency Contact #:     2. Clinician Name:    Phone:      Clinician Pager or Emergency Contact #:      3. Suicide Prevention Lifeline: 9-757-980-TALK (7669)     4. [address] [place] [address] Emergency Services -  [facility] example, [facility]: [phone]      Emergency Services Address: [facility] [address]      Emergency Services Phone: [phone]     Making the environment safe: How can I make my environment (house/apartment/living space) safer?  For example, can I remove guns, medications, and other items? 1.  Nothing at this time  2.

## 2021-04-05 NOTE — GROUP NOTE
[facility] GROUP DOCUMENTATION INDIVIDUAL Group Therapy Note Date: 4/4/2021 Group Start Time: 2000 Group End Time: 2045 Group Topic: [employer] Shavonne French Ava Minaya 
 
[facility] GROUP DOCUMENTATION GROUP Group Therapy Note Attendees: 5 Attendance: Attended Patient's Goal:  To go home Interventions/techniques: Informed, Promoted peer support and Provide feedback Follows Directions: Followed directions Interactions: Interacted appropriately Mental Status: Calm Behavior/appearance: Cooperative Goals Achieved: Able to engage in interactions, Able to listen to others and Able to give feedback to another Additional Notes:  Patient was encouraged to attend and participate in wrap-up group. Patient participated in group and snack then stated he had no pain, no anxiety, no depression, and no suicidal thoughts. Frieda Manley

## 2021-04-05 NOTE — BH NOTES
Behavioral Health Transition Record to Provider    Patient Name: Bety Bourne  YOB: 1970  Medical Record Number: [id number]  Date of Admission: 3/26/2021  Date of Discharge: 4/5/2021    Attending Provider: Ani Carranza MD  Discharging Provider: Evelina Martinez  To contact this individual call [phone] and ask the  to page. If unavailable, ask to be transferred to [address] [place] [address] Provider on call. [facility] Provider will be available on call 24/7 and during holidays. Primary Care Provider: UNKNOWN    No Known Allergies    Reason for Admission: psychosis    Admission Diagnosis: B[facility] 1 disorder ([facility] [F31.9]    * No surgery found *    Results for orders placed or performed during the hospital encounter of 03/26/21   GLUCOSE, FASTING   Result Value Ref Range    Glucose 106 (H) 65 - 100 MG/DL   HEMOGLOBIN A1C WITH EAG   Result Value Ref Range    Hemoglobin A1c 5.3 4.0 - 5.6 %    Est. average glucose 105 mg/dL   LIPID PANEL   Result Value Ref Range    LIPID PROFILE          Cholesterol, total 112 <200 MG/DL    Triglyceride 71 <150 MG/DL    HDL Cholesterol 42 MG/DL    LDL, calculated 55.8 0 - 100 MG/DL    VLDL, calculated 14.2 MG/DL    CHOL/HDL Ratio 2.7 0.0 - 5.0     TSH 3RD GENERATION   Result Value Ref Range    TSH 1.05 0.36 - 3.74 uIU/mL   VALPROIC ACID   Result Value Ref Range    Valproic acid 76 50 - 100 ug/ml   HEPATIC FUNCTION PANEL   Result Value Ref Range    Protein, total 6.5 6.4 - 8.2 g/dL    Albumin 3.2 (L) 3.5 - 5.0 g/dL    Globulin 3.3 2.0 - 4.0 g/dL    A-G Ratio 1.0 (L) 1.1 - 2.2      Bilirubin, total 0.3 0.2 - 1.0 MG/DL    Bilirubin, direct 0.1 0.0 - 0.2 MG/DL    Alk.  phosphatase 57 45 - 117 U/L    AST (SGOT) 18 15 - 37 U/L    ALT (SGPT) 33 12 - 78 U/L   CBC WITH AUTOMATED DIFF   Result Value Ref Range    WBC 8.6 4.1 - 11.1 K/uL    RBC 4.85 4.10 - 5.70 M/uL    HGB 15.0 12.1 - 17.0 g/dL    HCT 44.4 36.6 - 50.3 %    MCV 91.5 80.0 - 99.0 FL    MCH 30.9 26.0 - 34.0 PG    MCHC 33.8 30.0 - 36.5 g/dL    RDW 13.2 11.5 - 14.5 %    PLATELET 439 607 - 404 K/uL    MPV 10.4 8.9 - 12.9 FL    NRBC 0.0 0  WBC    ABSOLUTE NRBC 0.00 0.00 - 0.01 K/uL    NEUTROPHILS 55 32 - 75 %    LYMPHOCYTES 30 12 - 49 %    MONOCYTES 9 5 - 13 %    EOSINOPHILS 5 0 - 7 %    BASOPHILS 1 0 - 1 %    IMMATURE GRANULOCYTES 0 0.0 - 0.5 %    ABS. NEUTROPHILS 4.6 1.8 - 8.0 K/UL    ABS. LYMPHOCYTES 2.6 0.8 - 3.5 K/UL    ABS. MONOCYTES 0.8 0.0 - 1.0 K/UL    ABS. EOSINOPHILS 0.5 (H) 0.0 - 0.4 K/UL    ABS. BASOPHILS 0.1 0.0 - 0.1 K/UL    ABS. IMM. GRANS. 0.0 0.00 - 0.04 K/UL    DF AUTOMATED     SAMPLES BEING HELD   Result Value Ref Range    SAMPLES BEING HELD 1SST     COMMENT        Add-on orders for these samples will be processed based on acceptable specimen integrity and analyte stability, which may vary by analyte. EKG, 12 LEAD, INITIAL   Result Value Ref Range    Ventricular Rate 68 BPM    Atrial Rate 68 BPM    P-R Interval 130 ms    QRS Duration 98 ms    Q-T Interval 398 ms    QTC Calculation (Bezet) 423 ms    Calculated P Axis 62 degrees    Calculated R Axis 78 degrees    Calculated T Axis 60 degrees    Diagnosis       Normal sinus rhythm  Normal ECG  No previous ECGs available  Confirmed by Kavitha Rojas MD, --- ([id number]) on 3/30/2021 10:29:02 PM         Immunizations administered during this encounter: There is no immunization history on file for this patient. Screening for Metabolic Disorders for Patients on Antipsychotic Medications  (Data obtained from the EMR)    Estimated Body Mass Index  Estimated body mass index is 24.41 kg/m² as calculated from the following:    Height as of this encounter: 5' 11\" (1.803 m). Weight as of this encounter: 79.4 kg (175 lb).      Vital Signs/Blood Pressure  Visit Vitals  /64 (BP 1 Location: Left arm, BP Patient Position: Sitting)   Pulse 79   Temp (!) 96.4 °F (35.8 °C)   Resp 16   Ht 5' 11\" (1.803 m)   Wt 79.4 kg (175 lb)   SpO2 98%   BMI 24.41 kg/m²       Blood Glucose/Hemoglobin A1c  Lab Results   Component Value Date/Time    Glucose 106 (H) 03/27/2021 07:42 AM       Lab Results   Component Value Date/Time    Hemoglobin A1c 5.3 03/27/2021 07:47 AM        Lipid Panel  Lab Results   Component Value Date/Time    Cholesterol, total 112 03/27/2021 07:42 AM    HDL Cholesterol 42 03/27/2021 07:42 AM    LDL, calculated 55.8 03/27/2021 07:42 AM    Triglyceride 71 03/27/2021 07:42 AM    CHOL/HDL Ratio 2.7 03/27/2021 07:42 AM        Discharge Diagnosis:BPAD,I, manic with psychotic features    Discharge Plan: Please keep all scheduled follow up appointments. If you are unable to keep your appointment with your physician or therapist, please call them at least 24 hours in advance, if possible, so another appointment can be scheduled. It is important that you maintain contact with your physician(s) after discharge. Discharge Medication List and Instructions:   Current Discharge Medication List      CONTINUE these medications which have CHANGED    Details   divalproex ER (DEPAKOTE ER) 500 mg ER tablet Take 3 Tabs by mouth nightly. Qty: 90 Tab, Refills: 0      QUEtiapine (SEROquel) 400 mg tablet Take 2 Tabs by mouth nightly. Qty: 60 Tab, Refills: 0             Unresulted Labs (24h ago, onward)    None        To obtain results of studies pending at discharge, please contact     Follow-up Information     Follow up With Specialties Details Why Contact Info    Dr. Ant Chapa  Call on 4/7/2021 at 9:30am  [facility] [place]   [phone]          Advanced Directive:   Does the patient have an appointed surrogate decision maker? No  Does the patient have a Medical Advance Directive? No  Does the patient have a Psychiatric Advance Directive?  No  If the patient does not have a surrogate or Medical Advance Directive AND Psychiatric Advance Directive, the patient was offered information on these advance directives Yes and Patient will complete at a later time    Patient Instructions: Please continue all medications until otherwise directed by physician. Tobacco Cessation Discharge Plan:   Is the patient a smoker and needs referral for smoking cessation? Yes  Patient referred to the following for smoking cessation with an appointment? Yes     Patient was offered medication to assist with smoking cessation at discharge? Yes  Was education for smoking cessation added to the discharge instructions? Yes    Alcohol/Substance Abuse Discharge Plan:   Does the patient have a history of substance/alcohol abuse and requires a referral for treatment? No  Patient referred to the following for substance/alcohol abuse treatment with an appointment? Not applicable  Patient was offered medication to assist with alcohol cessation at discharge? Not applicable  Was education for substance/alcohol abuse added to discharge instructions? Not applicable    Patient discharged to Home; discussed with patient/caregiver and provided to the patient/caregiver either in hard copy or electronically.

## 2021-04-05 NOTE — GROUP NOTE
[facility]  GROUP DOCUMENTATION INDIVIDUAL Group Therapy Note Date: 4/5/2021 Group Start Time: 6615 Group End Time: 1100 Group Topic: [employer] Jocelyne Phelan [facility]  GROUP DOCUMENTATION GROUP Group Therapy Note Attendees: 6 Attendance: Attended Patient's Goal:  To put a plan together Interventions/techniques: Provide feedback Follows Directions: Followed directions Interactions: Interacted appropriately Mental Status: Calm Behavior/appearance: Cooperative Goals Achieved: Able to listen to others Additional Notes:  Patient appetite was good, no physical pain, no suicidal thoughts. Rahul Space

## 2021-04-05 NOTE — PROGRESS NOTES
Problem: Psychosis    Goal: *STG: Decreased hallucinations    Affect blunted, mood depressed/anxious with improvement. Denied AVH. No overt evidence of AVH. +Mild paranoia. Assessed/monitored pt for signs/symptoms of thought disorder. Encouraged  sharing of feelings. Monitored medication compliance and effectiveness. Monitored pt's daily level of functioning. Encouraged focus on reality. Offered encouragement, reassurance, and support.     Outcome: Progressing Towards Goal

## 2021-04-05 NOTE — GROUP NOTE
[facility] GROUP DOCUMENTATION INDIVIDUAL Group Therapy Note Date: 4/5/2021 Group Start Time: 1300 Group End Time: 2471 Group Topic: Process Group - Inpatient [address] OP [place] [facility] GROUP DOCUMENTATION GROUP Group Therapy Note Focus of session was on issues and emotional baggage from the past and how it may continue to be impacting our functioning today. We discussed how we can have anger towards family members and that may have been what has protected us from getting hurt again. I spoke with group about the need to accept other people's limitations and how if we don't accept that, then we get continuously hurt and angry towards others. We spoke about how we have to move forward and that may include accepting a need to forgive others and to accept them as they are. If we cannot be around them much, then that is how they take care of themselves. Group members were asked to share something that they can let go of today from the past so they can lighten their burden one step at a time. Attendance: {[facility] ATTENDANCE:71143} Patient's Goal:  *** Interventions/techniques: {[facility] GROUP DOC INTERVENTIONS/TECHNIQUES:37643} Follows Directions: {[facility] FOLLOWS DIRECTION:52212} Interactions: {[facility] INTERACTIONS:12481} Mental Status: {[facility] MENTAL RHEYRM:33892} Behavior/appearance: {[facility] GROUP DOC BEHAVIOR/APPEARANCE:57306} Goals Achieved: {[facility] GROUP DOC GOALS ACHIEVED:58545} Additional Notes:  *** Chase Parker

## 2021-04-05 NOTE — DISCHARGE INSTRUCTIONS
Patient Education        Bipolar Disorder: Care Instructions  Your Care Instructions     Bipolar disorder is an illness that causes extreme mood changes, from times of very high energy (manic episodes) to times of depression. But many people with bipolar disorder show only the symptoms of depression. These moods may cause problems with your work, school, family life, friendships, and how well you function. This disease is also called manic-depression. There is no cure for bipolar disorder, but it can be helped with medicines. Counseling may also help. It is important to take your medicines exactly as prescribed, even when you feel well. You may need lifelong treatment. Follow-up care is a key part of your treatment and safety. Be sure to make and go to all appointments, and call your doctor if you are having problems. It's also a good idea to know your test results and keep a list of the medicines you take. How can you care for yourself at home? · Be safe with medicines. Take your medicines exactly as prescribed. Do not stop or change a medicine without talking to your doctor first. Denzel Vaca and your doctor may need to try different combinations of medicines to find what works for you. · Take your medicines on schedule to keep your moods even. When you feel good, you may think that you do not need your medicines. But it is important to keep taking them. · Go to your counseling sessions. Call and talk with your counselor if you can't go to a session or if you don't think the sessions are helping. Do not just stop going. · Get at least 30 minutes of activity on most days of the week. Walking is a good choice. You also may want to do other things, such as running, swimming, or cycling. · Get enough sleep. Keep your room dark and quiet. Try to go to bed at the same time every night. · Eat a healthy diet. This includes whole grains, dairy, fruits, vegetables, and protein. Eat foods from each of these groups.   · Try to lower your stress. Manage your time, build a strong support system, and lead a healthy lifestyle. To lower your stress, try physical activity, slow deep breathing, or getting a massage. · Do not use alcohol, marijuana, or illegal drugs. · Learn the early signs of your mood changes. You can then take steps to help yourself feel better. · Ask for help from friends and family when you need it. You may need help with daily chores when you are depressed. When you are manic, you may need support to control your high energy levels. What should you do if someone in your family has bipolar disorder? · Learn about the disease and signs it's getting worse. · Remind your family member you love them. · Make a plan with all family members about how to take care of your loved one when symptoms are bad. · Remind yourself it will take time for changes to occur. · Try not to blame yourself for the disease. · Know your legal rights and the legal rights of your family member. Support groups or counselors can help with this information. · Take care of yourself. Keep up with your interests, such as career, hobbies, and friends. Use exercise, positive self-talk, deep breathing, and other relaxing exercises to help lower your stress. · Give yourself time to grieve. You may need to deal with emotions such as anger, fear, and frustration. · If you are having a hard time with your feelings or with your relationship with your family member, talk with a counselor. When should you call for help? Call 911 anytime you think you may need emergency care. For example, call if:    · You feel like hurting yourself or someone else.     · Someone who has bipolar disorder displays dangerous behavior, and you think the person might hurt himself or herself or someone else. Call your doctor now or seek immediate medical care if:    · You hear voices.     · Someone you know has bipolar disorder and talks about suicide.  Keep the numbers for these national suicide hotlines: 1-050-829-TALK (8-803.431.3564) and 1-290-URNNFQN (3-989.972.8071). If a suicide threat seems real, with a specific plan and a way to carry it out, stay with the person, or ask someone you trust to stay with the person, until you can get help.     · Someone you know has bipolar disorder and:  ? Starts to give away possessions. ? Is using illegal drugs or drinking alcohol heavily. ? Talks or writes about death, including writing suicide notes or talking about guns, knives, or pills. ? Talks or writes about hurting someone else. ? Starts to spend a lot of time alone. ? Acts very aggressively or suddenly appears calm. ? Talks about beliefs that are not based in reality (delusions). Watch closely for changes in your health, and be sure to contact your doctor if:    · You cannot go to your counseling sessions. Where can you learn more? Go to http://www.mendosa.com/  Enter K052 in the search box to learn more about \"Bipolar Disorder: Care Instructions. \"  Current as of: September 23, 2020               Content Version: 12.8  © 7575-8419 [employer]. Care instructions adapted under license by [employer] (which disclaims liability or warranty for this information). If you have questions about a medical condition or this instruction, always ask your healthcare professional. [place] [address] any warranty or liability for your use of this information. Patient Education        Learning About Mood Disorders  What are mood disorders? Mood disorders are medical problems that affect how you feel. They can impact your moods, thoughts, and actions. Mood disorders include:  · Depression. This causes you to feel sad or hopeless for much of the time. · Bipolar disorder. This causes extreme mood changes from manic episodes of very high energy to extreme lows of depression. · Seasonal affective disorder (SAD). This is a type of depression that affects you during the same season each year. Most often people experience SAD during the fall and winter months when days are shorter and there is less light. What are the symptoms? Depression  You may:  · Feel sad or hopeless nearly every day. · Lose interest in or not get pleasure from most daily activities. You feel this way nearly every day. · Have low energy, changes in your appetite, or changes in how well you sleep. · Have trouble concentrating. · Think about death and suicide. Keep the numbers for these national suicide hotlines: 0-495-449-TALK (9-957.378.6825) and 7-413-JMTRYJD (1-239.131.6765). If you or someone you know talks about suicide or feeling hopeless, get help right away. Bipolar disorder  Symptoms depend on your mood swings. You may:  · Feel very happy, energetic, or on edge. · Feel like you need very little sleep. · Feel overly self-confident. · Do impulsive things, such as spending a lot of money. · Feel sad or hopeless. · Have racing thoughts or trouble thinking and making decisions. · Lose interest in things you have enjoyed in the past.  · Think about death and suicide. Keep the numbers for these national suicide hotlines: 9-313-324-TALK (3-940.836.1731) and 1-426-MSGJFJK (1-460.596.1381). If you or someone you know talks about suicide or feeling hopeless, get help right away. Seasonal affective disorder (SAD)  Symptoms come and go at about the same time each year. For most people with SAD, symptoms come during the winter when there is less daylight. You may:  · Feel sad, grumpy, kinsey, or anxious. · Lose interest in your usual activities. · Eat more and crave carbohydrates, such as bread and pasta. · Gain weight. · Sleep more and feel drowsy during the daytime. How are mood disorders treated? Mood disorders can be treated with medicines or counseling, or a combination of both.   Medicines for depression and SAD may include antidepressants. Medicines for bipolar disorder may include:  · Mood stabilizers. · Antipsychotics. · Benzodiazepines. Counseling may involve cognitive-behavioral therapy. It teaches you how to change the ways you think and behave. This can help you stop thinking bad thoughts about yourself and your life. Light therapy is the main treatment for SAD. This therapy uses a special kind of lamp. You let the lamp shine on you at certain times, usually in the morning. This may help your symptoms during the months when there is less sunlight. Healthy lifestyle  Healthy lifestyle changes may help you feel better. · Be active often. You might try walking or strength training. · Eat a healthy diet. Include fruits, vegetables, lean proteins, and whole grains in your diet each day. · Keep a regular sleep schedule. Try for 8 hours of sleep a night. · Find ways to manage stress, such as relaxation exercises. · Avoid alcohol and illegal drugs. Follow-up care is a key part of your treatment and safety. Be sure to make and go to all appointments, and call your doctor if you are having problems. It's also a good idea to know your test results and keep a list of the medicines you take. Where can you learn more? Go to http://www.gray.com/  Enter Z126 in the search box to learn more about \"Learning About Mood Disorders. \"  Current as of: September 23, 2020               Content Version: 12.8  © 5915-6488 [employer]. Care instructions adapted under license by [employer] (which disclaims liability or warranty for this information). If you have questions about a medical condition or this instruction, always ask your healthcare professional. [place] [address] any warranty or liability for your use of this information.          BEHAVIORAL HEALTH NURSING DISCHARGE NOTE      The following personal items collected during your admission are returned to you:   Dental Appliance:    Vision: Visual Aid: Glasses, With patient  Hearing Aid:    Jewelry: Jewelry: Ring  Clothing: Clothing: Socks  Other Valuables:    Valuables sent to safe:        PATIENT INSTRUCTIONS:    What to do at Home:    You may not drink any alcoholic beverages during the next 48-hours. You may resume normal activities. Avoid making any critical decisions for at least 24-hours. Recommended diet: Regular Diet. Recommended activity: Activity as tolerated. [facility] 7-535.300.6323. Smoking Cessation Hotline 8-940-QUIT NOW (127-4051). If you have problems relating to your recovery, call your physician. The discharge information has been reviewed with the patient. The patient verbalized understanding.
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
## 2021-08-19 NOTE — PROGRESS NOTES
Problem: Psychosis    Goal: *STG/LTG: Complies with medication therapy    Affect and mood labile. Denied AH. +VH. +paranoid delusions. + delusions of grandeur. Confused and disoriented to  time, place, and situation. Tearful at times. Assessed/monitored pt for signs/symptoms of thought disorder. Encouraged sharing of feelings. Monitored medication compliance and effectiveness. Monitored pt's daily level of functioning. Provided encouragement, reassurance, and support.     Outcome: Progressing Towards Goal (4) excellent

## 2022-03-19 PROBLEM — F31.2 BIPOLAR AFFECTIVE DISORDER, MANIC, SEVERE, WITH PSYCHOTIC BEHAVIOR (HCC): Status: ACTIVE | Noted: 2021-03-26

## 2022-06-23 NOTE — MASTER TREATMENT PLAN
[facility] 60 Master Treatment Plan for Jordin Rosen Date Treatment Plan Initiated: 03/26/2021 Treatment Plan Modalities: 
Type of Modality Amount (x minutes) Frequency (x/week) Duration (x days) Name of Responsible Staff Community & wrap-up meetings to encourage peer interactions 30 14 7 Kayce MARTI, CNA/MHT Edmar Slade., MHT Group psychotherapy to assist in building coping skills and internal controls [id number] [address], [facility] Walt Tay., [facility] Therapeutic activity groups to build coping skills 60 7 7 Dioni Yin., MHT Psychoeducation in group setting to address:  
Medication education Coping Skills Symptom Management [id number] [address], [facility] Walt Tay., [facility] Cee Green., RN Raleigh Quinn RN Discharge planning [id number] [place] [address] Spirituality 60 2 1 Letty Rogers.  
Physician medication management 15 7 7 MD LAMONT Marques Asp, MD  
      
      
 
      
 
      
 
 
Treatment Team Signatures I have participated in the development of this plan of treatment and agree to its implementation. Patient Signature Patient Printed Name Date/Time Social Work/Therapist Signature Social Work/Therapist Printed Name Date/Time RN Signature RN Printed Name Usman Castrejon RN Date/Time 
03/26/2021 at 7915 Other Signature Other Signature Date/Time MD Signature MD Printed Name Date/Time Patient/Caregiver provided printed discharge information.

## 2023-02-09 NOTE — BH NOTES
Behavioral Health Interdisciplinary Rounds     Patient Name: Niraj Greer  Age: 48 y.o. Room/Bed:  234/01  Primary Diagnosis: Bipolar affective disorder, manic, severe, with psychotic behavior ([address]   Admission Status: Involuntary Commitment     Readmission within 30 days: no  Power of  in place: no  Patient requires a blocked bed: no          Reason for blocked bed:     VTE Prophylaxis: Not indicated    Mobility needs/Fall risk: yes  Flu Vaccine : no   Nutritional Plan: no  Consults: no         Labs/Testing due today?: no    Sleep hours: 7       Participation in Care/Groups:  yes  Medication Compliant?: Yes  PRNS (last 24 hours): None    Restraints (last 24 hours):  no     CIWA (range last 24 hours):     COWS (range last 24 hours):      Alcohol screening (AUDIT) completed -   AUDIT Score: 2     If applicable, date SBIRT discussed in treatment team AND documented:   AUDIT Screen Score: AUDIT Score: 2      Document Brief Intervention (corresponds directly with the 5 A's, Ask, Advise, Assess, Assist, and Arrange): At- Risk Patients (Score 7-15 for women; 8-15 for men)  Discuss concern patient is drinking at unhealthy levels known to increase risk of alcohol-related health problems. Is Patient ready to commit to change? If No:   Encourage reflection   Discuss short term and long term health risks of consuming alcohol   Barriers to change   Reaffirm willingness to help / Educational materials provided  If Yes:   Set goal  [employer] provided    Harmful use or Dependence (Score 16 or greater)   Discuss short term and long term health risks of consuming alcohol   Recommendations   Negotiate drinking goal   Recommend addiction specialist/center   Arrange follow-up appointments.     Tobacco - patient is a smoker: Have You Used Tobacco in the Past 30 Days: Yes  Illegal Drugs use: Have You Used Any Illegal Substances Over the Past 12 Months: No    24 hour chart check complete: yes     Patient goal(s) for today: get focused and get medications adjusted, sleep better  Treatment team focus/goals: remain safe in the hospital  Progress note patient reports feeling better each day does not feel as hyper not expressing delusions    LOS:  6  Expected LOS: 4-5    Financial concerns/prescription coverage:  no  Family contact: no      Family requesting physician contact today:  no  Discharge plan: home with outpatient tx  Access to weapons : no        [facility]  Patient's preferred phone number for follow up call : [phone]    Participating treatment team members: Joey Schaefer, * (assigned SW), Nivia Schlatter Unilateral weakness

## 2023-05-25 RX ORDER — QUETIAPINE FUMARATE 400 MG/1
800 TABLET, FILM COATED ORAL
COMMUNITY
Start: 2021-04-05

## 2023-05-25 RX ORDER — DIVALPROEX SODIUM 500 MG/1
1500 TABLET, EXTENDED RELEASE ORAL
COMMUNITY
Start: 2021-04-05